# Patient Record
Sex: MALE | Race: BLACK OR AFRICAN AMERICAN | NOT HISPANIC OR LATINO | Employment: FULL TIME | URBAN - METROPOLITAN AREA
[De-identification: names, ages, dates, MRNs, and addresses within clinical notes are randomized per-mention and may not be internally consistent; named-entity substitution may affect disease eponyms.]

---

## 2017-01-23 PROBLEM — E55.9 VITAMIN D DEFICIENCY: Status: ACTIVE | Noted: 2017-01-23

## 2017-01-23 PROBLEM — R17 ELEVATED BILIRUBIN: Status: ACTIVE | Noted: 2017-01-23

## 2017-01-23 PROBLEM — M51.16 LUMBAR DISC HERNIATION WITH RADICULOPATHY: Status: ACTIVE | Noted: 2017-01-23

## 2017-01-23 PROBLEM — R74.8 ELEVATED LIVER ENZYMES: Status: ACTIVE | Noted: 2017-01-23

## 2017-01-24 ENCOUNTER — OFFICE VISIT (OUTPATIENT)
Dept: FAMILY MEDICINE CLINIC | Facility: CLINIC | Age: 57
End: 2017-01-24

## 2017-01-24 VITALS
RESPIRATION RATE: 18 BRPM | OXYGEN SATURATION: 99 % | DIASTOLIC BLOOD PRESSURE: 96 MMHG | HEIGHT: 71 IN | TEMPERATURE: 97.8 F | HEART RATE: 68 BPM | WEIGHT: 221 LBS | BODY MASS INDEX: 30.94 KG/M2 | SYSTOLIC BLOOD PRESSURE: 138 MMHG

## 2017-01-24 DIAGNOSIS — I82.402 ACUTE DEEP VEIN THROMBOSIS (DVT) OF LEFT LOWER EXTREMITY, UNSPECIFIED VEIN (HCC): Primary | ICD-10-CM

## 2017-01-24 DIAGNOSIS — R22.42 MASS OF LOWER LEG, LEFT: ICD-10-CM

## 2017-01-24 PROCEDURE — 99214 OFFICE O/P EST MOD 30 MIN: CPT | Performed by: FAMILY MEDICINE

## 2017-01-24 RX ORDER — RIVAROXABAN 20 MG/1
TABLET, FILM COATED ORAL
COMMUNITY
Start: 2016-11-28 | End: 2017-01-24

## 2017-01-24 NOTE — MR AVS SNAPSHOT
Dylan Sen   1/24/2017 11:00 AM   Office Visit    Dept Phone:  [phone]   Encounter #:  [id number]    Provider:  Isaias Harmon MD   Department:  [facility]                Your Full Care Plan              Today's Medication Changes          These changes are accurate as of: 1/24/17 11:36 AM.  If you have any questions, ask your nurse or doctor.               Medication(s)that have changed:     * XARELTO 20 MG tablet   Generic drug:  rivaroxaban   What changed:  Another medication with the same name was added. Make sure you understand how and when to take each.   Changed by:  Isaias Harmon MD       * rivaroxaban 15 MG tablet   Commonly known as:  XARELTO   Take 1 tablet by mouth Daily.   What changed:  You were already taking a medication with the same name, and this prescription was added. Make sure you understand how and when to take each.   Changed by:  Isaias Harmon MD       * Notice:  This list has 2 medication(s) that are the same as other medications prescribed for you. Read the directions carefully, and ask your doctor or other care provider to review them with you.      Stop taking medication(s)listed here:     HYDROcodone-acetaminophen 5-325 MG per tablet   Commonly known as:  NORCO   Stopped by:  Isaias Harmon MD           oxyCODONE-acetaminophen 5-325 MG per tablet   Commonly known as:  PERCOCET   Stopped by:  Isaias Harmon MD           promethazine 25 MG tablet   Commonly known as:  PHENERGAN   Stopped by:  Isaias Harmon MD                Where to Get Your Medications      Information about where to get these medications is not yet available     ! Ask your nurse or doctor about these medications     rivaroxaban 15 MG tablet                  Your Updated Medication List          This list is accurate as of: 1/24/17 11:36 AM.  Always use your most recent med list.                cholecalciferol 1000 UNITS tablet   Commonly known  "as:  VITAMIN D3       * XARELTO 20 MG tablet   Generic drug:  rivaroxaban       * rivaroxaban 15 MG tablet   Commonly known as:  XARELTO   Take 1 tablet by mouth Daily.       * Notice:  This list has 2 medication(s) that are the same as other medications prescribed for you. Read the directions carefully, and ask your doctor or other care provider to review them with you.            You Were Diagnosed With        Codes Comments    Acute deep vein thrombosis (DVT) of left lower extremity, unspecified vein    -  Primary ICD-10-CM: I82.402  ICD-9-CM: 453.40       Instructions     None    Patient Instructions History      Upcoming Appointments     Visit Type Date Time Department    FOLLOW UP 2017 11:00 AM [facility]      [employer] Signup     [facility] [employer] allows you to send messages to your doctor, view your test results, renew your prescriptions, schedule appointments, and more. To sign up, go to [email or web address] and click on the Sign Up Now link in the New User? box. Enter your [employer] Activation Code exactly as it appears below along with the last four digits of your Social Security Number and your Date of Birth () to complete the sign-up process. If you do not sign up before the expiration date, you must request a new code.    [employer] Activation Code: [email or web address]  Expires: 2017 11:36 AM    If you have questions, you can email [email or web address] or call [phone] to talk to our [employer] staff. Remember, [employer] is NOT to be used for urgent needs. For medical emergencies, dial 911.               Other Info from Your Visit           Allergies     No Known Allergies      Reason for Visit     blood clot left leg and very painful           Vital Signs     Blood Pressure Pulse Temperature Respirations Height Weight    138/96 68 97.8 °F (36.6 °C) (Temporal Artery ) 18 71\" (180.3 cm) 221 lb (100 kg)    Oxygen Saturation Body Mass Index Smoking Status             99% 30.82 " kg/m2 Never Smoker         Problems and Diagnoses Noted     Acute deep vein thrombosis (DVT) of left lower extremity, unspecified vein    -  Primary

## 2017-01-24 NOTE — PROGRESS NOTES
Chief Complaint   Patient presents with   • blood clot left leg and very painful       Subjective     Leg Pain    The incident occurred more than 1 week ago. There was no injury mechanism. The pain is present in the left leg and left knee. The quality of the pain is described as aching and burning. The pain is moderate. The pain has been worsening since onset. Pertinent negatives include no inability to bear weight, loss of sensation, numbness or tingling. He has tried nothing for the symptoms. The treatment provided no relief.       Left Lower Ext DVT  + blood clot in Left leg  Increased in last week.   Pain behind the left knee and increased  no chest pain no shortness of breath  This is different pain then knee issue  + pulling in the left leg    Did not miss any days of the xarelto but increased to 2 per day for last 3 days with increased pain          Past Medical History,Medications, Allergies, and social history was reviewed.    Review of Systems   Constitutional: Negative.    HENT: Negative.    Respiratory: Negative for shortness of breath.    Cardiovascular: Negative for chest pain.   Gastrointestinal: Negative.    Musculoskeletal: Positive for arthralgias (left knee and leg).   Neurological: Negative.  Negative for tingling and numbness.   Psychiatric/Behavioral: Negative.        Objective     Physical Exam   Constitutional: He is oriented to person, place, and time. He appears well-developed and well-nourished.   HENT:   Head: Normocephalic and atraumatic.   Right Ear: External ear normal.   Left Ear: External ear normal.   Eyes: EOM are normal. Pupils are equal, round, and reactive to light.   Cardiovascular: Normal rate and normal heart sounds.    Pulmonary/Chest: Effort normal.   Musculoskeletal:        Left knee: He exhibits decreased range of motion and swelling (tenderness and swelling behind the left knee.  Extending to the calf.  Not red or hot.).   Neurological: He is alert and oriented to  person, place, and time.   Skin: Skin is warm and dry.   Psychiatric: He has a normal mood and affect. His behavior is normal.   Nursing note and vitals reviewed.        Assessment/Plan     Problem List Items Addressed This Visit     None      Visit Diagnoses     Acute deep vein thrombosis (DVT) of left lower extremity, unspecified vein    -  Primary    Relevant Orders    Duplex Venous Lower Extremity - Left CAR    Mass of lower leg, left        Relevant Orders    Ambulatory Referral to Orthopedic Surgery            DISCUSSION  Sent urgently over for a venous Doppler to evaluate for worsening DVT.  Result showed that the clot has resolved.  There was no DVT.  However, it did show a large mass in the popliteal area/And possibly hematoma or atypical Baker cyst.  We will refer to Dr. Clemons for urgent evaluation given increasing pain and swelling.  Since the clot has dissolved and he has been about 2 months since the onset of the DVT, we will stop the Xarelto.  I explained to him that the clot had resolved and there was no further DVT.  However, given structural mass of the leg, and decreased mobility, there is increased of developing another clot.  Therefore, I would like for him to see orthopedics as soon as possible to further assess this before restarting Xarelto.  If it is a hematoma, I do not want to make it worse by continuing the blood thinner in light of the normal scan for clot.  He is agreeable.       MEDICATIONS PRESCRIBED  Requested Prescriptions      No prescriptions requested or ordered in this encounter          Isaias Harmon MD

## 2017-02-20 ENCOUNTER — TELEPHONE (OUTPATIENT)
Dept: FAMILY MEDICINE CLINIC | Facility: CLINIC | Age: 57
End: 2017-02-20

## 2017-02-21 ENCOUNTER — TELEPHONE (OUTPATIENT)
Dept: FAMILY MEDICINE CLINIC | Facility: CLINIC | Age: 57
End: 2017-02-21

## 2017-02-21 NOTE — TELEPHONE ENCOUNTER
----- Message from Elda Chaparro sent at 2/21/2017  1:11 PM EST -----  Contact: TAQUERIA HUNTER  PATIENT RETURNED YOU CALL PLEASE CALL HIM BACK [phone]

## 2017-07-24 ENCOUNTER — OFFICE VISIT (OUTPATIENT)
Dept: FAMILY MEDICINE CLINIC | Facility: CLINIC | Age: 57
End: 2017-07-24

## 2017-07-24 VITALS
DIASTOLIC BLOOD PRESSURE: 90 MMHG | HEIGHT: 71 IN | BODY MASS INDEX: 31.78 KG/M2 | HEART RATE: 64 BPM | SYSTOLIC BLOOD PRESSURE: 124 MMHG | WEIGHT: 227 LBS | RESPIRATION RATE: 20 BRPM | TEMPERATURE: 97.6 F

## 2017-07-24 DIAGNOSIS — E55.9 VITAMIN D DEFICIENCY: ICD-10-CM

## 2017-07-24 DIAGNOSIS — R03.0 ELEVATED BLOOD PRESSURE (NOT HYPERTENSION): Primary | ICD-10-CM

## 2017-07-24 DIAGNOSIS — R53.83 FATIGUE, UNSPECIFIED TYPE: ICD-10-CM

## 2017-07-24 DIAGNOSIS — Z23 NEED FOR TDAP VACCINATION: ICD-10-CM

## 2017-07-24 DIAGNOSIS — Z13.220 SCREENING FOR HYPERLIPIDEMIA: ICD-10-CM

## 2017-07-24 DIAGNOSIS — Z12.5 SCREENING PSA (PROSTATE SPECIFIC ANTIGEN): ICD-10-CM

## 2017-07-24 PROCEDURE — 90471 IMMUNIZATION ADMIN: CPT | Performed by: FAMILY MEDICINE

## 2017-07-24 PROCEDURE — 99396 PREV VISIT EST AGE 40-64: CPT | Performed by: FAMILY MEDICINE

## 2017-07-24 PROCEDURE — 90715 TDAP VACCINE 7 YRS/> IM: CPT | Performed by: FAMILY MEDICINE

## 2017-07-24 NOTE — PROGRESS NOTES
Subjective   Dylan Sen is a 56 y.o. male.     History of Present Illness   Here for annual exam  Colonoscopy is up to date  Tdap booster needs updated today.   He is requesting updated labs. He has been more fatigued recently, requesting testosterone evaluation.  Gradual weight gain since having knee surgery in Nov 2016. Currently not exercising or following specific diet.     The following portions of the patient's history were reviewed and updated as appropriate: allergies, current medications, past family history, past medical history, past social history, past surgical history and problem list.    Review of Systems   Constitutional: Positive for fatigue. Negative for chills and fever.   HENT: Negative for congestion, ear pain and hearing loss.    Eyes: Negative for photophobia, pain and visual disturbance.   Respiratory: Negative for cough, chest tightness and shortness of breath.    Cardiovascular: Negative for chest pain, palpitations and leg swelling.   Gastrointestinal: Negative for abdominal pain, blood in stool, constipation, diarrhea and vomiting.   Endocrine: Negative for cold intolerance, heat intolerance, polydipsia, polyphagia and polyuria.   Genitourinary: Negative for difficulty urinating and dysuria.   Musculoskeletal: Negative for back pain and gait problem.   Skin: Negative for color change, rash and wound.   Allergic/Immunologic: Negative for environmental allergies, food allergies and immunocompromised state.   Neurological: Negative for dizziness, weakness, numbness and headaches.   Hematological: Negative for adenopathy. Does not bruise/bleed easily.   Psychiatric/Behavioral: Negative for agitation, confusion and sleep disturbance.       Objective   Physical Exam   Constitutional: He is oriented to person, place, and time. He appears well-developed and well-nourished.   HENT:   Head: Normocephalic and atraumatic.   Right Ear: Hearing, tympanic membrane, external ear and ear canal  normal.   Left Ear: Hearing, tympanic membrane, external ear and ear canal normal.   Nose: Nose normal.   Mouth/Throat: Uvula is midline, oropharynx is clear and moist and mucous membranes are normal.   Eyes: Conjunctivae and EOM are normal. Pupils are equal, round, and reactive to light.   Neck: Normal range of motion. Neck supple. No tracheal deviation present. No thyromegaly present.   Cardiovascular: Normal rate, regular rhythm, normal heart sounds and intact distal pulses.    No murmur heard.  Pulmonary/Chest: Effort normal and breath sounds normal. No respiratory distress. He has no wheezes.   Abdominal: Soft. Bowel sounds are normal. He exhibits no distension. There is no tenderness.   Musculoskeletal: He exhibits no edema or deformity.   Lymphadenopathy:     He has no cervical adenopathy.   Neurological: He is alert and oriented to person, place, and time. No cranial nerve deficit.   Skin: Skin is warm and dry. No rash noted.   Psychiatric: He has a normal mood and affect. His behavior is normal. Judgment and thought content normal.   Nursing note and vitals reviewed.      Assessment/Plan   Dylan was seen today for annual exam.    Diagnoses and all orders for this visit:    Elevated blood pressure (not hypertension)    Fatigue, unspecified type  -     Comprehensive Metabolic Panel  -     CBC & Differential  -     Testosterone  -     PSA  -     TSH  -     Vitamin B12    Vitamin D deficiency  -     Comprehensive Metabolic Panel  -     CBC & Differential  -     Vitamin D 25 Hydroxy    Need for Tdap vaccination  -     Tdap Vaccine Greater Than or Equal To 6yo IM  -     Comprehensive Metabolic Panel  -     CBC & Differential    Screening for hyperlipidemia  -     Lipid Panel    Screening PSA (prostate specific antigen)  -     PSA      Recheck BP still elevated diastolic. Advised him to monitor BP at home, target is <140/90. If blood pressure is staying elevated, will need to return to start treatment.   Labs  completed today.   Advised him to follow well balanced diet, resume routine exercise. Has gradually gained 10 lbs since knee operation in Nov 2016.   Tdap vaccination updated today, complete every 10 years. Complete influenza vaccination yearly.

## 2017-07-25 LAB
25(OH)D3+25(OH)D2 SERPL-MCNC: 28.1 NG/ML
ALBUMIN SERPL-MCNC: 4.4 G/DL (ref 3.2–4.8)
ALBUMIN/GLOB SERPL: 1.5 G/DL (ref 1.5–2.5)
ALP SERPL-CCNC: 80 U/L (ref 25–100)
ALT SERPL-CCNC: 76 U/L (ref 7–40)
AST SERPL-CCNC: 40 U/L (ref 0–33)
BASOPHILS # BLD AUTO: 0.03 10*3/MM3 (ref 0–0.2)
BASOPHILS NFR BLD AUTO: 0.5 % (ref 0–1)
BILIRUB SERPL-MCNC: 1.6 MG/DL (ref 0.3–1.2)
BUN SERPL-MCNC: 13 MG/DL (ref 9–23)
BUN/CREAT SERPL: 10.8 (ref 7–25)
CALCIUM SERPL-MCNC: 10 MG/DL (ref 8.7–10.4)
CHLORIDE SERPL-SCNC: 105 MMOL/L (ref 99–109)
CHOLEST SERPL-MCNC: 199 MG/DL (ref 0–200)
CO2 SERPL-SCNC: 28 MMOL/L (ref 20–31)
CREAT SERPL-MCNC: 1.2 MG/DL (ref 0.6–1.3)
EOSINOPHIL # BLD AUTO: 0.17 10*3/MM3 (ref 0–0.3)
EOSINOPHIL NFR BLD AUTO: 3.1 % (ref 0–3)
ERYTHROCYTE [DISTWIDTH] IN BLOOD BY AUTOMATED COUNT: 14 % (ref 11.3–14.5)
GLOBULIN SER CALC-MCNC: 2.9 GM/DL
GLUCOSE SERPL-MCNC: 115 MG/DL (ref 70–100)
HCT VFR BLD AUTO: 43.8 % (ref 38.9–50.9)
HDLC SERPL-MCNC: 50 MG/DL (ref 40–60)
HGB BLD-MCNC: 13.5 G/DL (ref 13.1–17.5)
IMM GRANULOCYTES # BLD: 0.02 10*3/MM3 (ref 0–0.03)
IMM GRANULOCYTES NFR BLD: 0.4 % (ref 0–0.6)
LDLC SERPL CALC-MCNC: 128 MG/DL (ref 0–100)
LYMPHOCYTES # BLD AUTO: 2.14 10*3/MM3 (ref 0.6–4.8)
LYMPHOCYTES NFR BLD AUTO: 39.1 % (ref 24–44)
MCH RBC QN AUTO: 25.8 PG (ref 27–31)
MCHC RBC AUTO-ENTMCNC: 30.8 G/DL (ref 32–36)
MCV RBC AUTO: 83.7 FL (ref 80–99)
MONOCYTES # BLD AUTO: 0.57 10*3/MM3 (ref 0–1)
MONOCYTES NFR BLD AUTO: 10.4 % (ref 0–12)
NEUTROPHILS # BLD AUTO: 2.54 10*3/MM3 (ref 1.5–8.3)
NEUTROPHILS NFR BLD AUTO: 46.5 % (ref 41–71)
PLATELET # BLD AUTO: 170 10*3/MM3 (ref 150–450)
POTASSIUM SERPL-SCNC: 4.6 MMOL/L (ref 3.5–5.5)
PROT SERPL-MCNC: 7.3 G/DL (ref 5.7–8.2)
PSA SERPL-MCNC: 1.31 NG/ML (ref 0–4)
RBC # BLD AUTO: 5.23 10*6/MM3 (ref 4.2–5.76)
SODIUM SERPL-SCNC: 140 MMOL/L (ref 132–146)
TESTOST SERPL-MCNC: 333 NG/DL (ref 264–916)
TRIGL SERPL-MCNC: 105 MG/DL (ref 0–150)
TSH SERPL DL<=0.005 MIU/L-ACNC: 1.53 MIU/ML (ref 0.35–5.35)
VIT B12 SERPL-MCNC: 478 PG/ML (ref 211–911)
VLDLC SERPL CALC-MCNC: 21 MG/DL
WBC # BLD AUTO: 5.47 10*3/MM3 (ref 3.5–10.8)

## 2017-09-11 ENCOUNTER — TELEPHONE (OUTPATIENT)
Dept: FAMILY MEDICINE CLINIC | Facility: CLINIC | Age: 57
End: 2017-09-11

## 2017-09-11 ENCOUNTER — OFFICE VISIT (OUTPATIENT)
Dept: FAMILY MEDICINE CLINIC | Facility: CLINIC | Age: 57
End: 2017-09-11

## 2017-09-11 VITALS
SYSTOLIC BLOOD PRESSURE: 122 MMHG | HEART RATE: 64 BPM | DIASTOLIC BLOOD PRESSURE: 92 MMHG | WEIGHT: 219.8 LBS | RESPIRATION RATE: 20 BRPM | BODY MASS INDEX: 30.77 KG/M2 | TEMPERATURE: 97.4 F | HEIGHT: 71 IN

## 2017-09-11 DIAGNOSIS — M51.16 LUMBAR DISC HERNIATION WITH RADICULOPATHY: Primary | ICD-10-CM

## 2017-09-11 DIAGNOSIS — R73.09 ELEVATED GLUCOSE: ICD-10-CM

## 2017-09-11 DIAGNOSIS — I10 ESSENTIAL HYPERTENSION: ICD-10-CM

## 2017-09-11 LAB — HBA1C MFR BLD: 4.6 % (ref 4.8–5.6)

## 2017-09-11 PROCEDURE — 99213 OFFICE O/P EST LOW 20 MIN: CPT | Performed by: FAMILY MEDICINE

## 2017-09-11 NOTE — PROGRESS NOTES
Subjective   Dylan Sen is a 56 y.o. male.     History of Present Illness   He has chronic lumbar back pain, started 2010. Saw Dr. Crooks for this issue, wanted to do surgery but patient declined. He has managed symptoms with chiropractor, but stopped going once symptoms improved. Over the last 4-5 months, symptoms of low back pain has worsened. He recently resumed chiropractor treatment and doing home exercises which does improve pain some.   Pain is located bilateral lumbar spine with radiation of numbness, tingling, pain into both lower extremities.     Elevated blood pressure  This is a chronic issue, hasn't taken medication to treat in years.   History of left knee pain, had recent operation to improve.    He has lost some weight since his last visit in July 2017, 8 pounds.    The following portions of the patient's history were reviewed and updated as appropriate: allergies, current medications, past family history, past medical history, past social history, past surgical history and problem list.    Review of Systems   Constitutional: Negative for chills and fever.   Respiratory: Negative for cough and shortness of breath.    Cardiovascular: Negative for chest pain and palpitations.   Musculoskeletal: Positive for back pain. Negative for gait problem, joint swelling and myalgias.   Neurological: Positive for numbness. Negative for dizziness.       Objective   Physical Exam   Constitutional: He is oriented to person, place, and time. He appears well-developed and well-nourished.   HENT:   Head: Normocephalic and atraumatic.   Right Ear: External ear normal.   Left Ear: External ear normal.   Nose: Nose normal.   Eyes: Conjunctivae are normal.   Neck: Normal range of motion.   Cardiovascular: Normal rate, regular rhythm and normal heart sounds.    Pulmonary/Chest: Effort normal and breath sounds normal.   Musculoskeletal: He exhibits no edema or deformity.        Lumbar back: He exhibits no tenderness,  no bony tenderness and no spasm.   Neurological: He is alert and oriented to person, place, and time. No cranial nerve deficit.   Skin: Skin is warm and dry.   Psychiatric: He has a normal mood and affect. His behavior is normal.   Nursing note and vitals reviewed.      Assessment/Plan   Dylan was seen today for back pain.    Diagnoses and all orders for this visit:    Lumbar disc herniation with radiculopathy  -     Ambulatory Referral to Neurosurgery    Elevated glucose  -     Hemoglobin A1c    Essential hypertension    Referred to Dr. Caraballo, per his request. MRI lumbar spine completed Feb 2016.   Most recent lab results were discussed with patient. He will increase vitamin D replacement to 2000 units/day.   Completed a1c today due to elevated glucose.  He has had elevated diastolic blood pressure on regular occasions. He is aware that this increases his risk of heart attack and stroke, however he is reluctant to start medication treatment. He wants to attempt control with diet and exercise. Since having his left knee operation, he is now able to resume exercise.

## 2017-09-11 NOTE — PATIENT INSTRUCTIONS
Go to the nearest ER or return to clinic if symptoms worsen, fever/chill develop      Back Pain, Adult  Back pain is very common in adults. The cause of back pain is rarely dangerous and the pain often gets better over time. The cause of your back pain may not be known. Some common causes of back pain include:  · Strain of the muscles or ligaments supporting the spine.  · Wear and tear (degeneration) of the spinal disks.  · Arthritis.  · Direct injury to the back.  For many people, back pain may return. Since back pain is rarely dangerous, most people can learn to manage this condition on their own.  HOME CARE INSTRUCTIONS  Watch your back pain for any changes. The following actions may help to lessen any discomfort you are feeling:  · Remain active. It is stressful on your back to sit or  one place for long periods of time. Do not sit, drive, or  one place for more than 30 minutes at a time. Take short walks on even surfaces as soon as you are able. Try to increase the length of time you walk each day.  · Exercise regularly as directed by your health care provider. Exercise helps your back heal faster. It also helps avoid future injury by keeping your muscles strong and flexible.  · Do not stay in bed. Resting more than 1-2 days can delay your recovery.  · Pay attention to your body when you bend and lift. The most comfortable positions are those that put less stress on your recovering back. Always use proper lifting techniques, including:    Bending your knees.    Keeping the load close to your body.    Avoiding twisting.  · Find a comfortable position to sleep. Use a firm mattress and lie on your side with your knees slightly bent. If you lie on your back, put a pillow under your knees.  · Avoid feeling anxious or stressed. Stress increases muscle tension and can worsen back pain. It is important to recognize when you are anxious or stressed and learn ways to manage it, such as with  exercise.  · Take medicines only as directed by your health care provider. Over-the-counter medicines to reduce pain and inflammation are often the most helpful. Your health care provider may prescribe muscle relaxant drugs. These medicines help dull your pain so you can more quickly return to your normal activities and healthy exercise.  · Apply ice to the injured area:    Put ice in a plastic bag.    Place a towel between your skin and the bag.    Leave the ice on for 20 minutes, 2-3 times a day for the first 2-3 days. After that, ice and heat may be alternated to reduce pain and spasms.  · Maintain a healthy weight. Excess weight puts extra stress on your back and makes it difficult to maintain good posture.  SEEK MEDICAL CARE IF:  · You have pain that is not relieved with rest or medicine.  · You have increasing pain going down into the legs or buttocks.  · You have pain that does not improve in one week.  · You have night pain.  · You lose weight.  · You have a fever or chills.  SEEK IMMEDIATE MEDICAL CARE IF:   · You develop new bowel or bladder control problems.  · You have unusual weakness or numbness in your arms or legs.  · You develop nausea or vomiting.  · You develop abdominal pain.  · You feel faint.     This information is not intended to replace advice given to you by your health care provider. Make sure you discuss any questions you have with your health care provider.     Document Released: 12/18/2006 Document Revised: 01/08/2016 Document Reviewed: 04/21/2015  [employer] Patient Education ©2017 [employer]

## 2017-09-12 NOTE — TELEPHONE ENCOUNTER
Please inform patient that Dr. Caraballo requires MRI within the last 6 months before scheduling appointment.  MRI lumbar spine has been ordered. MICHELLE

## 2017-09-25 ENCOUNTER — HOSPITAL ENCOUNTER (OUTPATIENT)
Dept: MRI IMAGING | Facility: HOSPITAL | Age: 57
Discharge: HOME | End: 2017-09-25
Attending: FAMILY MEDICINE | Admitting: FAMILY MEDICINE

## 2017-09-25 PROCEDURE — 72148 MRI LUMBAR SPINE W/O DYE: CPT

## 2017-09-26 DIAGNOSIS — M51.16 LUMBAR DISC HERNIATION WITH RADICULOPATHY: Primary | ICD-10-CM

## 2019-05-13 ENCOUNTER — OFFICE VISIT (OUTPATIENT)
Dept: FAMILY MEDICINE CLINIC | Facility: CLINIC | Age: 59
End: 2019-05-13

## 2019-05-13 VITALS
TEMPERATURE: 96.2 F | RESPIRATION RATE: 18 BRPM | SYSTOLIC BLOOD PRESSURE: 130 MMHG | DIASTOLIC BLOOD PRESSURE: 88 MMHG | HEIGHT: 71 IN | HEART RATE: 60 BPM | WEIGHT: 215 LBS | BODY MASS INDEX: 30.1 KG/M2

## 2019-05-13 DIAGNOSIS — T15.92XA FOREIGN BODY OF LEFT EYE, INITIAL ENCOUNTER: ICD-10-CM

## 2019-05-13 DIAGNOSIS — H57.12 DISCOMFORT OF LEFT EYE: Primary | ICD-10-CM

## 2019-05-13 PROCEDURE — 99213 OFFICE O/P EST LOW 20 MIN: CPT | Performed by: PHYSICIAN ASSISTANT

## 2019-05-13 NOTE — PROGRESS NOTES
Subjective   Dylan Sen is a 58 y.o. male.     History of Present Illness   Pt presents with CC of concern of FB in L eye. Pt was wearing safety goggles and spraying his furnace filter when water went everywhere. Felt something suddenly get into eye. This was on 5/11/19.   Tried flushing out eye with minimal relief  Still feels discomfort in L eye. Some eye itching, tenderness and drainage.   No URI symptoms   Some swelling of eyelids   Does not wear contacts. Does not have established eye doctor.   Some eye pain with movement. No vision changes  Discomfort is felt along medial cornea     The following portions of the patient's history were reviewed and updated as appropriate: allergies, current medications, past family history, past medical history, past social history, past surgical history and problem list.    Review of Systems   Constitutional: Negative.  Negative for chills, diaphoresis, fatigue and fever.   HENT: Negative.  Negative for congestion, ear discharge, ear pain, hearing loss, nosebleeds, postnasal drip, sinus pressure, sneezing and sore throat.    Eyes: Positive for pain, discharge, redness and itching.   Respiratory: Negative.  Negative for cough, chest tightness, shortness of breath and wheezing.    Cardiovascular: Negative.  Negative for chest pain, palpitations and leg swelling.   Gastrointestinal: Negative for abdominal distention, abdominal pain, blood in stool, constipation, diarrhea, nausea and vomiting.   Genitourinary: Negative.  Negative for difficulty urinating, dysuria, flank pain, frequency, hematuria and urgency.   Musculoskeletal: Negative.  Negative for arthralgias, back pain, gait problem, joint swelling, myalgias, neck pain and neck stiffness.   Skin: Negative.  Negative for color change, pallor, rash and wound.   Neurological: Negative for dizziness, syncope, weakness, light-headedness, numbness and headaches.       Objective    Blood pressure 130/88, pulse 60,  "temperature 96.2 °F (35.7 °C), temperature source Temporal, resp. rate 18, height 180.3 cm (71\"), weight 97.5 kg (215 lb).     Physical Exam   Constitutional: He is oriented to person, place, and time. He appears well-developed and well-nourished.   HENT:   Head: Normocephalic and atraumatic.   Left Ear: Tympanic membrane, external ear and ear canal normal.   Nose: Nose normal.   Mouth/Throat: Oropharynx is clear and moist. No oropharyngeal exudate, posterior oropharyngeal edema or posterior oropharyngeal erythema.   Excess cerumen in R ear canal    Eyes: Right conjunctiva is not injected. Right conjunctiva has no hemorrhage. Left conjunctiva is injected. Right eye exhibits normal extraocular motion and no nystagmus. Left eye exhibits normal extraocular motion and no nystagmus.   No visible FB in L eye.   Some irritation around medial L cornea   Dried drainage along skin surrounding eye   Eyelids slightly edematous    Neck: Normal range of motion. Neck supple. No tracheal deviation present. No thyromegaly present.   Cardiovascular: Normal rate, regular rhythm, normal heart sounds and intact distal pulses. Exam reveals no gallop and no friction rub.   No murmur heard.  Pulmonary/Chest: Effort normal and breath sounds normal. No respiratory distress. He has no wheezes. He has no rales. He exhibits no tenderness.   Abdominal: Soft. Bowel sounds are normal. He exhibits no distension and no mass. There is no tenderness. There is no rebound and no guarding. No hernia.   Musculoskeletal: Normal range of motion. He exhibits no edema, tenderness or deformity.   Lymphadenopathy:     He has no cervical adenopathy.   Neurological: He is alert and oriented to person, place, and time.   Skin: Skin is warm and dry.   Psychiatric: He has a normal mood and affect. His behavior is normal. Judgment and thought content normal.   Nursing note and vitals reviewed.      Assessment/Plan   Dylan was seen today for foreign body in " eye.    Diagnoses and all orders for this visit:    Discomfort of left eye  -     Ambulatory Referral to Ophthalmology    Foreign body of left eye, initial encounter  -     Ambulatory Referral to Ophthalmology      Due to nature of possible eye injury and persistent symptoms, will get patient in with ophthalmology. Able to see patient this afternoon. F/U PRN

## 2021-08-18 ENCOUNTER — TELEPHONE (OUTPATIENT)
Dept: FAMILY MEDICINE CLINIC | Facility: CLINIC | Age: 61
End: 2021-08-18

## 2021-08-18 NOTE — TELEPHONE ENCOUNTER
Caller: Kaylah Senciaran GARRETT    Relationship: Self    Best call back number:[phone]    What is the medical concern/diagnosis: PAIN LEFT KNEE    What specialty or service is being requested: OPEN MRI    What is the provider, practice or medical service name:    What is the office location:     What is the office phone number:    Any additional details: PATIENT HAS APPOINTMENT 08/19 AND IS REQUESTING MRI TODAY SO DR MENG WILL HAVE RESULTS FOR APPOINTMENT

## 2021-08-19 ENCOUNTER — OFFICE VISIT (OUTPATIENT)
Dept: FAMILY MEDICINE CLINIC | Facility: CLINIC | Age: 61
End: 2021-08-19

## 2021-08-19 VITALS
HEART RATE: 72 BPM | DIASTOLIC BLOOD PRESSURE: 88 MMHG | SYSTOLIC BLOOD PRESSURE: 130 MMHG | BODY MASS INDEX: 30.78 KG/M2 | TEMPERATURE: 96.8 F | WEIGHT: 215 LBS | HEIGHT: 70 IN | RESPIRATION RATE: 18 BRPM

## 2021-08-19 DIAGNOSIS — S89.92XD INJURY OF LEFT KNEE, SUBSEQUENT ENCOUNTER: Primary | ICD-10-CM

## 2021-08-19 DIAGNOSIS — M25.562 PAIN AND SWELLING OF LEFT KNEE: ICD-10-CM

## 2021-08-19 DIAGNOSIS — R29.898 DIFFICULTY IN WEIGHT BEARING: ICD-10-CM

## 2021-08-19 DIAGNOSIS — R93.6 ABNORMAL MRI, KNEE: ICD-10-CM

## 2021-08-19 DIAGNOSIS — M25.462 PAIN AND SWELLING OF LEFT KNEE: ICD-10-CM

## 2021-08-19 DIAGNOSIS — M25.362 KNEE INSTABILITY, LEFT: ICD-10-CM

## 2021-08-19 PROCEDURE — 99213 OFFICE O/P EST LOW 20 MIN: CPT | Performed by: PHYSICIAN ASSISTANT

## 2021-08-19 NOTE — PROGRESS NOTES
"Subjective   Dylan Sen is a 60 y.o. male.     History of Present Illness   Pt presents with L knee pain   Was racing with grand kids and fell   Fell on Sunday. Fell on concrete. Has some abrasions on palms of hands where he caught himself  Scab on L knee. Pain along lateral knee   Went to ER. XR was normal.   Having some swelling   Pain with extending knee and full extension   Has had meniscus concern in l knee in the past. Had scope but no repair. 4 years ago     Pain is doing a little better.   Hard time bearing weight   Ambulating with crutches.   Knee feels like it wants to buckle     Not taking any medication   Has been icing and elevating     The following portions of the patient's history were reviewed and updated as appropriate: allergies, current medications, past family history, past medical history, past social history, past surgical history and problem list.    Review of Systems   Constitutional: Negative for chills and fever.   Respiratory: Negative for cough, shortness of breath and wheezing.    Cardiovascular: Negative for chest pain.   Gastrointestinal: Negative for diarrhea, nausea and vomiting.   Musculoskeletal: Positive for arthralgias, gait problem and joint swelling.   Skin: Negative for rash.       Objective    Blood pressure 130/88, pulse 72, temperature 96.8 °F (36 °C), resp. rate 18, height 177.8 cm (70\"), weight 97.5 kg (215 lb).     Physical Exam  Vitals and nursing note reviewed.   Constitutional:       Appearance: Normal appearance.   HENT:      Head: Normocephalic.      Right Ear: External ear normal.      Left Ear: External ear normal.      Nose: Nose normal.   Eyes:      Conjunctiva/sclera: Conjunctivae normal.   Cardiovascular:      Rate and Rhythm: Normal rate and regular rhythm.   Pulmonary:      Effort: Pulmonary effort is normal. No respiratory distress.      Breath sounds: Normal breath sounds.   Musculoskeletal:      Left knee: Swelling and bony tenderness present. " Decreased range of motion. Tenderness present over the lateral joint line.      Comments: Ambulating with crutches     superficial scabbed abrasion of   L knee    Skin:     General: Skin is warm and dry.      Comments: Abrasions to palms bilaterally    Neurological:      Mental Status: He is alert and oriented to person, place, and time.   Psychiatric:         Mood and Affect: Mood normal.         Behavior: Behavior normal.         Thought Content: Thought content normal.         Judgment: Judgment normal.         Assessment/Plan   Diagnoses and all orders for this visit:    1. Injury of left knee, subsequent encounter (Primary)  -     MRI Knee Left Without Contrast; Future    2. Pain and swelling of left knee  -     MRI Knee Left Without Contrast; Future    3. Knee instability, left  -     MRI Knee Left Without Contrast; Future    4. Difficulty in weight bearing  -     MRI Knee Left Without Contrast; Future      Proceed with MRI of L knee   Pt will need work documentation. Has not been into work this week.   RICE therapy and NSAID

## 2021-08-20 NOTE — PROGRESS NOTES
I have reviewed the notes, assessments, and/or procedures performed by RAY Mckeon, I concur with her/his documentation of Dylan Sen.

## 2021-08-23 ENCOUNTER — TELEPHONE (OUTPATIENT)
Dept: FAMILY MEDICINE CLINIC | Facility: CLINIC | Age: 61
End: 2021-08-23

## 2021-08-23 NOTE — TELEPHONE ENCOUNTER
Caller: Dylan Sen    Relationship to patient: Self    Best call back number:[phone]    Patient is needing: PATIENT WAS CALLING TO CHECK ON STATUS OF MRI     PLEASE ADVISE

## 2021-09-10 ENCOUNTER — OFFICE VISIT (OUTPATIENT)
Dept: ORTHOPEDIC SURGERY | Facility: CLINIC | Age: 61
End: 2021-09-10

## 2021-09-10 VITALS
HEIGHT: 70 IN | HEART RATE: 63 BPM | BODY MASS INDEX: 30.29 KG/M2 | WEIGHT: 211.6 LBS | DIASTOLIC BLOOD PRESSURE: 102 MMHG | SYSTOLIC BLOOD PRESSURE: 168 MMHG

## 2021-09-10 DIAGNOSIS — S83.422A SPRAIN OF LATERAL COLLATERAL LIGAMENT OF LEFT KNEE, INITIAL ENCOUNTER: Primary | ICD-10-CM

## 2021-09-10 DIAGNOSIS — M17.12 PRIMARY OSTEOARTHRITIS OF LEFT KNEE: ICD-10-CM

## 2021-09-10 DIAGNOSIS — M25.562 LEFT KNEE PAIN, UNSPECIFIED CHRONICITY: ICD-10-CM

## 2021-09-10 PROCEDURE — 99244 OFF/OP CNSLTJ NEW/EST MOD 40: CPT | Performed by: ORTHOPAEDIC SURGERY

## 2021-09-10 RX ORDER — MELOXICAM 15 MG/1
TABLET ORAL
Qty: 90 TABLET | Refills: 1 | Status: SHIPPED | OUTPATIENT
Start: 2021-09-10

## 2021-09-10 NOTE — PROGRESS NOTES
Orthopaedic Clinic Note: Knee New Patient    Chief Complaint   Patient presents with   • Left Knee - Pain        HPI  Consult from: RAY Mckeon    Dylan Sen is a 60 y.o. male who presents with left knee pain for 4 week(s). Onset mechanical fall. Pain is localized to the lateral joint line and is a 3/10 on the pain scale. Pain is described as aching, throbbing and stabbing. Associated symptoms include pain and giving way/buckling. The pain is worse with walking, sleeping, rising from seated position and any movement of the joint; ice make it better. Previous treatments have included: NSAIDS since symptom onset. Although some transient relief was reported with these interventions, these conservative measures have failed and symptoms have persisted. The patient is limited in daily activities and has had a significant decrease in quality of life as a result. He denies fevers, chills, or constitutional symptoms.    I have reviewed the following portions of the patient's history:History of Present Illness    Past Medical History:   Diagnosis Date   • Elevated bilirubin    • Elevated liver enzymes    • Hyperlipidemia    • Lumbar back pain    • Numbness of right foot    • Pharyngitis    • Plantar wart of left foot    • Right shoulder injury    • Right shoulder pain       Past Surgical History:   Procedure Laterality Date   • SHOULDER ARTHROSCOPY Left    • SHOULDER SURGERY      Rotator Cuff      Family History   Problem Relation Age of Onset   • Diabetes Mother    • Heart attack Father    • Diabetes Father    • Hypertension Father      Social History     Socioeconomic History   • Marital status:      Spouse name: Not on file   • Number of children: Not on file   • Years of education: Not on file   • Highest education level: Not on file   Tobacco Use   • Smoking status: Never Smoker   • Smokeless tobacco: Never Used   Substance and Sexual Activity   • Alcohol use: Yes     Comment: social   • Drug  "use: No   • Sexual activity: Defer      Current Outpatient Medications on File Prior to Visit   Medication Sig Dispense Refill   • cholecalciferol (VITAMIN D3) 1000 UNITS tablet Take 1,000 Units by mouth daily.       No current facility-administered medications on file prior to visit.      No Known Allergies     Review of Systems   Constitutional: Negative.    HENT: Negative.    Eyes: Negative.    Respiratory: Negative.    Cardiovascular: Negative.    Gastrointestinal: Negative.    Endocrine: Negative.    Genitourinary: Negative.    Musculoskeletal: Positive for arthralgias.   Skin: Negative.    Allergic/Immunologic: Negative.    Neurological: Negative.    Hematological: Negative.    Psychiatric/Behavioral: Negative.         The patient's Review of Systems was personally reviewed and confirmed as accurate.    The following portions of the patient's history were reviewed and updated as appropriate: allergies, current medications, past family history, past medical history, past social history, past surgical history and problem list.    Physical Exam  Blood pressure (!) 168/102, pulse 63, height 177.8 cm (70\"), weight 96 kg (211 lb 9.6 oz).    Body mass index is 30.36 kg/m².    GENERAL APPEARANCE: awake, alert & oriented x 3, in no acute distress and well developed, well nourished  PSYCH: normal affect  LUNGS:  breathing nonlabored  EYES: sclera anicteric  CARDIOVASCULAR: palpable dorsalis pedis, palpable posterior tibial bilaterally. Capillary refill less than 2 seconds  EXTREMITIES: no clubbing, cyanosis  GAIT:  Normal            Right Lower Extremity Exam:   ----------  Hip Exam  ----------  FLEXION CONTRACTURE: None  FLEXION: 110 degrees  INTERNAL ROTATION: 20 degrees at 90 degrees of flexion   EXTERNAL ROTATION: 40 degrees at 90 degrees of flexion    PAIN WITH HIP MOTION: no  ----------  Knee Exam  ----------  ALIGNMENT: neutral, no varus or valgus deformity     RANGE OF MOTION:  Normal (0-120 degrees) with no " extensor lag or flexion contracture  LIGAMENTOUS STABILITY:   stable to varus and valgus stress at terminal extension and 30 degrees without any evidence of laxity     STRENGTH:  5/5 knee flexion, extension. 5/5 ankle dorsiflexion and plantarflexion.     PAIN WITH PALPATION: denies tenderness to palpation about the knee, denies medial or lateral joint line pain  KNEE EFFUSION: no  PAIN WITH KNEE ROM: no  PATELLAR CREPITUS: yes, asymptomatic  SPECIAL EXAM FINDINGS:  Negative patellar compression    REFLEXES:  PATELLAR 2+/4  ACHILLES 2+/4    CLONUS: negative  STRAIGHT LEG TEST:   negative    SENSATION TO LIGHT TOUCH:  DEEP PERONEAL/SUPERFICIAL PERONEAL/SURAL/SAPHENOUS/TIBIAL:   intact    EDEMA:  no  ERYTHEMA:  no  WOUNDS/INCISIONS: no overlying skin problems.      Left Lower Extremity Exam:   ----------  Hip Exam  ----------  FLEXION CONTRACTURE: None  FLEXION: 110 degrees  INTERNAL ROTATION: 20 degrees at 90 degrees of flexion   EXTERNAL ROTATION: 40 degrees at 90 degrees of flexion    PAIN WITH HIP MOTION: no  ----------  Knee Exam  ----------  ALIGNMENT: mild varus, correctible to neutral    RANGE OF MOTION:  Normal (0-120 degrees) with no extensor lag or flexion contracture  LIGAMENTOUS STABILITY:   stable to varus and valgus stress at terminal extension and 30 degrees; retensioning of the MCL is appreciated with valgus stress at 30 degrees consistent with medial compartment degeneration     STRENGTH:  5/5 knee flexion, extension. 5/5 ankle dorsiflexion and plantarflexion.     PAIN WITH PALPATION: lateral joint line  KNEE EFFUSION: yes, trace effusion  PAIN WITH KNEE ROM: yes, laterally and anteriorly  PATELLAR CREPITUS: yes, painful and symptomatic  SPECIAL EXAM FINDINGS:  none    REFLEXES:  PATELLAR 2+/4  ACHILLES 2+/4    CLONUS: no  STRAIGHT LEG TEST:   negative    SENSATION TO LIGHT TOUCH:  DEEP PERONEAL/SUPERFICIAL PERONEAL/SURAL/SAPHENOUS/TIBIAL:   intact    EDEMA:  no  ERYTHEMA:  no  WOUNDS/INCISIONS:   no      ______________________________________________________________________  ______________________________________________________________________    RADIOGRAPHIC FINDINGS:   Indication: Left knee pain    Comparison: Todays xrays were compared to previous xrays from 9/8/2016    Left knee(s) 4 views: mild tricompartmental osteoarthritis with slight varus alignment.  No acute bony injury or fracture.  Small periarticular osteophytes visualized primarily in the medial compartment.    Outside MRI from 9/1/2021 was personally interpreted.  MRI shows evidence of complex degenerative changes involving the medial compartment of the knee with myxoid changes of the medial meniscus and slight horizontal tear involving the posterior third of the meniscus.  Advanced chondromalacia within the patellofemoral compartment is also identified with small joint effusion.  There does appear to be edema and partial tearing of the lateral collateral ligament with ligament fibers remaining intact.      Assessment/Plan:   Diagnosis Plan   1. Sprain of lateral collateral ligament of left knee, initial encounter  meloxicam (MOBIC) 15 MG tablet   2. Left knee pain, unspecified chronicity  XR Knee 4+ View Left   3. Primary osteoarthritis of left knee       Patient has a partial tear/sprain of the lateral collateral ligament.  I discussed that this can be treated nonoperatively, but will take time to heal.  He is approximately 3 weeks out from injury.  I will take approximately another 3 to 5 weeks for his symptoms to improve.  Offered him a hinged knee brace which he declines.  I recommended in order to expedite healing, and anti-inflammatory could be prescribed.  He is agreeable to this.  Meloxicam will be prescribed.  He will follow-up in 3 weeks for repeat assessment.    Ej Devries MD  09/10/21  10:41 EDT

## 2021-10-01 ENCOUNTER — OFFICE VISIT (OUTPATIENT)
Dept: ORTHOPEDIC SURGERY | Facility: CLINIC | Age: 61
End: 2021-10-01

## 2021-10-01 VITALS
HEIGHT: 70 IN | BODY MASS INDEX: 30.3 KG/M2 | HEART RATE: 66 BPM | DIASTOLIC BLOOD PRESSURE: 100 MMHG | SYSTOLIC BLOOD PRESSURE: 171 MMHG | WEIGHT: 211.64 LBS

## 2021-10-01 DIAGNOSIS — M17.12 PRIMARY OSTEOARTHRITIS OF LEFT KNEE: Primary | ICD-10-CM

## 2021-10-01 PROCEDURE — 20610 DRAIN/INJ JOINT/BURSA W/O US: CPT | Performed by: ORTHOPAEDIC SURGERY

## 2021-10-01 PROCEDURE — 99214 OFFICE O/P EST MOD 30 MIN: CPT | Performed by: ORTHOPAEDIC SURGERY

## 2021-10-01 RX ORDER — BUPIVACAINE HYDROCHLORIDE 2.5 MG/ML
3 INJECTION, SOLUTION EPIDURAL; INFILTRATION; INTRACAUDAL
Status: COMPLETED | OUTPATIENT
Start: 2021-10-01 | End: 2021-10-01

## 2021-10-01 RX ORDER — LIDOCAINE HYDROCHLORIDE 10 MG/ML
3 INJECTION, SOLUTION EPIDURAL; INFILTRATION; INTRACAUDAL; PERINEURAL
Status: COMPLETED | OUTPATIENT
Start: 2021-10-01 | End: 2021-10-01

## 2021-10-01 RX ORDER — TRIAMCINOLONE ACETONIDE 40 MG/ML
80 INJECTION, SUSPENSION INTRA-ARTICULAR; INTRAMUSCULAR
Status: COMPLETED | OUTPATIENT
Start: 2021-10-01 | End: 2021-10-01

## 2021-10-01 RX ADMIN — BUPIVACAINE HYDROCHLORIDE 3 ML: 2.5 INJECTION, SOLUTION EPIDURAL; INFILTRATION; INTRACAUDAL at 09:59

## 2021-10-01 RX ADMIN — LIDOCAINE HYDROCHLORIDE 3 ML: 10 INJECTION, SOLUTION EPIDURAL; INFILTRATION; INTRACAUDAL; PERINEURAL at 09:59

## 2021-10-01 RX ADMIN — TRIAMCINOLONE ACETONIDE 80 MG: 40 INJECTION, SUSPENSION INTRA-ARTICULAR; INTRAMUSCULAR at 09:59

## 2021-10-01 NOTE — PROGRESS NOTES
Procedure   Large Joint Arthrocentesis: L knee  Date/Time: 10/1/2021 9:59 AM  Consent given by: patient  Site marked: site marked  Timeout: Immediately prior to procedure a time out was called to verify the correct patient, procedure, equipment, support staff and site/side marked as required   Supporting Documentation  Indications: pain   Procedure Details  Location: knee - L knee  Preparation: Patient was prepped and draped in the usual sterile fashion  Needle size: 22 G  Approach: anterolateral  Medications administered: 3 mL bupivacaine (PF) 0.25 %; 3 mL lidocaine PF 1% 1 %; 80 mg triamcinolone acetonide 40 MG/ML  Patient tolerance: patient tolerated the procedure well with no immediate complications

## 2021-10-01 NOTE — PROGRESS NOTES
Orthopaedic Clinic Note: Knee Established Patient    Chief Complaint   Patient presents with   • Follow-up     3 week f/u Sprain of lateral collateral ligament of left knee and Primary osteoarthritis of left knee after trial of Meloxicam 15mg which was helpful        HPI    It has been 3  week(s) since Mr. Sen's last visit. He returns to clinic today for follow-up left knee pain.  At his last visit he was prescribed an oral anti-inflammatory.  He returns to clinic today stating that his knee pain is improving.  He currently rates it a 4/10 on the pain scale he is ambulatory with no assistive device.  He is complaining of pain primarily in the medial aspect of the knee as well as in the patellofemoral joint.  He denies any significant lateral based pain.  Overall he is doing slightly better but is still having limitations in daily activities as well as recurrence of knee swelling.    Past Medical History:   Diagnosis Date   • Elevated bilirubin    • Elevated liver enzymes    • Hyperlipidemia    • Lumbar back pain    • Numbness of right foot    • Pharyngitis    • Plantar wart of left foot    • Right shoulder injury    • Right shoulder pain       Past Surgical History:   Procedure Laterality Date   • SHOULDER ARTHROSCOPY Left    • SHOULDER SURGERY      Rotator Cuff      Family History   Problem Relation Age of Onset   • Diabetes Mother    • Heart attack Father    • Diabetes Father    • Hypertension Father      Social History     Socioeconomic History   • Marital status:      Spouse name: Not on file   • Number of children: Not on file   • Years of education: Not on file   • Highest education level: Not on file   Tobacco Use   • Smoking status: Never Smoker   • Smokeless tobacco: Never Used   Substance and Sexual Activity   • Alcohol use: Yes     Comment: social   • Drug use: No   • Sexual activity: Defer      Current Outpatient Medications on File Prior to Visit   Medication Sig Dispense Refill   •  "cholecalciferol (VITAMIN D3) 1000 UNITS tablet Take 1,000 Units by mouth daily.     • meloxicam (MOBIC) 15 MG tablet 1 PO Daily with food. 90 tablet 1     No current facility-administered medications on file prior to visit.      No Known Allergies     Review of Systems   Constitutional: Negative.    HENT: Negative.    Eyes: Negative.    Respiratory: Negative.    Cardiovascular: Negative.    Gastrointestinal: Negative.    Endocrine: Negative.    Genitourinary: Negative.    Musculoskeletal: Positive for arthralgias.   Skin: Negative.    Allergic/Immunologic: Negative.    Neurological: Negative.    Hematological: Negative.    Psychiatric/Behavioral: Negative.         The patient's Review of Systems was personally reviewed and confirmed as accurate.    Physical Exam  Blood pressure 171/100, pulse 66, height 177.8 cm (70\"), weight 96 kg (211 lb 10.3 oz).    Body mass index is 30.37 kg/m².    GENERAL APPEARANCE: awake, alert, oriented, in no acute distress and well developed, well nourished  LUNGS:  breathing nonlabored  EXTREMITIES: no clubbing, cyanosis  PERIPHERAL PULSES: palpable dorsalis pedis and posterior tibial pulses bilaterally.    GAIT:  Antalgic        ----------  Left Knee Exam:  ----------  ALIGNMENT: mild varus, correctible to neutral  ----------  RANGE OF MOTION:  Normal (0-120 degrees) with no extensor lag or flexion contracture  LIGAMENTOUS STABILITY:   stable to varus and valgus stress at terminal extension and 30 degrees; retensioning of the MCL is appreciated with valgus stress at 30 degrees consistent with medial compartment degeneration  ----------  STRENGTH:  KNEE FLEXION 5/5  KNEE EXTENSION  5/5  ANKLE DORSIFLEXION  5/5  ANKLE PLANTARFLEXION  5/5  ----------  PAIN WITH PALPATION:medial joint line and anterior knee  KNEE EFFUSION: yes, trace effusion  PAIN WITH KNEE ROM: yes  PATELLAR CREPITUS:  yes, painful and symptomatic  ----------  SENSATION TO LIGHT TOUCH:  DEEP PERONEAL/SUPERFICIAL " PERONEAL/SURAL/SAPHENOUS/TIBIAL:    intact  ----------  EDEMA:  no  ERYTHEMA:    no  WOUNDS/INCISIONS:   no  _____________________________________________________________________  _____________________________________________________________________    RADIOGRAPHIC FINDINGS:   No new imaging today    Assessment/Plan:   Diagnosis Plan   1. Primary osteoarthritis of left knee       Patient's residual pain is secondary to arthritic inflammation.  His prior MRI did demonstrate significant arthritic changes.  He is having no focal mechanical symptoms.  I recommended proceeding to an intra-articular cortisone injection today.  He is agreeable to this.  He will follow-up as needed.    Procedure Note:  I discussed with the patient the potential benefits of performing a therapeutic injection of the left knee as well as potential risks including but not limited to infection, swelling, pain, bleeding, bruising, nerve/vessel damage, skin color changes, transient elevation in blood glucose levels, and fat atrophy. After informed consent and verifying correct patient, procedure site, and type of procedure, the area was prepped with alcohol, ethyl chloride was used to numb the skin. Via the superior lateral approach, 3cc of 1% lidocaine, 3cc of 0.25% bupivicaine and 2 cc of 40mg/ml of Kenalog were injected into the left knee. The patient tolerated the procedure well. There were no complications. A sterile dressing was placed over the injection site.        Ej Devries MD  10/01/21  10:04 EDT

## 2021-10-19 ENCOUNTER — TELEPHONE (OUTPATIENT)
Dept: FAMILY MEDICINE CLINIC | Facility: CLINIC | Age: 61
End: 2021-10-19

## 2021-10-19 DIAGNOSIS — M25.562 CHRONIC PAIN OF LEFT KNEE: Primary | ICD-10-CM

## 2021-10-19 DIAGNOSIS — G89.29 CHRONIC PAIN OF LEFT KNEE: Primary | ICD-10-CM

## 2021-10-19 NOTE — TELEPHONE ENCOUNTER
I saw him for knee pain in August, is this what he is referring to? Does his orthopedic provider Dr. Devries recommend PT at this time?

## 2021-10-19 NOTE — TELEPHONE ENCOUNTER
Caller: Rosamaria Senghciaran GARRETT    Relationship: Self    Best call back number: [phone]        What specialty or service is being requested: PHYSICAL THERAPY FOR THE LEFT LEG     What is the provider, practice or medical service name: PATIENT DID NOT KNOW THE NAME     What is the office location: THE OFFICE ACROSS THE START FROM THE [facility]  BYPASS  IN [place]       PATIENT STATES THAT HE WAS SEEN FOR HIS LEFT LEG ON 09/09/2021 BY CARIN LOOMIS PLEASE CALL THE PATIENT TO LET HIM KNOW IF HE CAN DO PHYSICAL THAIRAPY

## 2021-10-20 NOTE — TELEPHONE ENCOUNTER
Dr Devries didn't recommend. Pt wants to start PT before starting back exercising so he doesn't hurt/injury himself.

## 2022-02-22 DIAGNOSIS — G89.29 CHRONIC PAIN OF LEFT KNEE: Primary | ICD-10-CM

## 2022-02-22 DIAGNOSIS — M25.562 CHRONIC PAIN OF LEFT KNEE: Primary | ICD-10-CM

## 2023-04-12 ENCOUNTER — OFFICE VISIT (OUTPATIENT)
Dept: FAMILY MEDICINE CLINIC | Facility: CLINIC | Age: 63
End: 2023-04-12
Payer: COMMERCIAL

## 2023-04-12 VITALS
HEART RATE: 67 BPM | OXYGEN SATURATION: 96 % | RESPIRATION RATE: 18 BRPM | DIASTOLIC BLOOD PRESSURE: 92 MMHG | TEMPERATURE: 97.3 F | BODY MASS INDEX: 32.13 KG/M2 | SYSTOLIC BLOOD PRESSURE: 132 MMHG | WEIGHT: 224.4 LBS | HEIGHT: 70 IN

## 2023-04-12 DIAGNOSIS — M25.561 ACUTE PAIN OF RIGHT KNEE: Primary | ICD-10-CM

## 2023-04-12 DIAGNOSIS — E66.9 OBESITY (BMI 30.0-34.9): ICD-10-CM

## 2023-04-12 DIAGNOSIS — Z76.89 ENCOUNTER FOR WEIGHT MANAGEMENT: ICD-10-CM

## 2023-04-12 RX ORDER — TIZANIDINE 4 MG/1
4 TABLET ORAL NIGHTLY PRN
Qty: 30 TABLET | Refills: 2 | Status: SHIPPED | OUTPATIENT
Start: 2023-04-12

## 2023-04-12 RX ORDER — PREDNISONE 10 MG/1
TABLET ORAL
Qty: 21 EACH | Refills: 0 | Status: SHIPPED | OUTPATIENT
Start: 2023-04-12

## 2023-04-12 RX ORDER — SEMAGLUTIDE 0.25 MG/.5ML
0.25 INJECTION, SOLUTION SUBCUTANEOUS
Qty: 2 ML | Refills: 0 | Status: SHIPPED | OUTPATIENT
Start: 2023-04-12

## 2023-04-12 NOTE — PROGRESS NOTES
"Subjective   Dylan GERRY Sen is a 62 y.o. male.     History of Present Illness   Right posterior and medial knee pain that started yesterday while jogging on treadmill   Hard to walk today. Knee feels stiff and hard to bend.   Had to go home from work yesterday   No sensation of popping or fall   Feels like knee is swollen   Has tried icing, topical muscle cream and wearing a compression brace. Also used crutches at home for a bit.   Issues with L knee in 2021 that required steroid injection by ortho     Also has concerns about weight. Body mass index is 32.2 kg/m².   Eating health and exercising regularly but still having difficulty losing weight. Over eats frequently     The following portions of the patient's history were reviewed and updated as appropriate: allergies, current medications, past family history, past medical history, past social history, past surgical history and problem list.    Review of Systems  As noted per HPI     Objective    Blood pressure 132/92, pulse 67, temperature 97.3 °F (36.3 °C), resp. rate 18, height 177.8 cm (70\"), weight 102 kg (224 lb 6.4 oz), SpO2 96 %.     Physical Exam  Vitals reviewed.   Constitutional:       Appearance: Normal appearance.   Cardiovascular:      Rate and Rhythm: Normal rate.   Pulmonary:      Effort: Pulmonary effort is normal.   Musculoskeletal:      Right knee: Swelling present. Decreased range of motion. Tenderness present over the medial joint line.      Comments: Antalgic gait   Mild swelling of posterior R knee    Neurological:      General: No focal deficit present.      Mental Status: He is alert and oriented to person, place, and time.   Psychiatric:         Mood and Affect: Mood normal.         Behavior: Behavior normal.         Assessment & Plan   Diagnoses and all orders for this visit:    1. Acute pain of right knee (Primary)  -     predniSONE (DELTASONE) 10 MG (21) dose pack; Use as directed on package  Dispense: 21 each; Refill: 0  -     " tiZANidine (ZANAFLEX) 4 MG tablet; Take 1 tablet by mouth At Night As Needed for Muscle Spasms.  Dispense: 30 tablet; Refill: 2    2. Obesity (BMI 30.0-34.9)  -     Semaglutide-Weight Management (Wegovy) 0.25 MG/0.5ML solution auto-injector; Inject 0.25 mg under the skin into the appropriate area as directed Every 7 (Seven) Days.  Dispense: 2 mL; Refill: 0    3. Encounter for weight management  -     Semaglutide-Weight Management (Wegovy) 0.25 MG/0.5ML solution auto-injector; Inject 0.25 mg under the skin into the appropriate area as directed Every 7 (Seven) Days.  Dispense: 2 mL; Refill: 0    steroid taper and muscle relaxer for knee. RICE therapy and will remain off work until Monday   Call if not improving to proceed with imaging or ortho consult   Trial of wegovy after completed treatment for knee to help with weight loss. Potential side effects discussed and pt directed on how to use pen

## 2023-04-25 ENCOUNTER — OFFICE VISIT (OUTPATIENT)
Dept: FAMILY MEDICINE CLINIC | Facility: CLINIC | Age: 63
End: 2023-04-25
Payer: COMMERCIAL

## 2023-04-25 ENCOUNTER — HOSPITAL ENCOUNTER (OUTPATIENT)
Dept: GENERAL RADIOLOGY | Facility: HOSPITAL | Age: 63
Discharge: HOME | End: 2023-04-25
Admitting: PHYSICIAN ASSISTANT
Payer: COMMERCIAL

## 2023-04-25 VITALS
DIASTOLIC BLOOD PRESSURE: 92 MMHG | HEART RATE: 60 BPM | SYSTOLIC BLOOD PRESSURE: 154 MMHG | WEIGHT: 222 LBS | OXYGEN SATURATION: 96 % | BODY MASS INDEX: 31.78 KG/M2 | TEMPERATURE: 97.5 F | HEIGHT: 70 IN | RESPIRATION RATE: 18 BRPM

## 2023-04-25 DIAGNOSIS — Z76.89 ENCOUNTER FOR WEIGHT MANAGEMENT: ICD-10-CM

## 2023-04-25 DIAGNOSIS — H00.015 HORDEOLUM EXTERNUM OF LEFT LOWER EYELID: ICD-10-CM

## 2023-04-25 DIAGNOSIS — E66.9 OBESITY (BMI 30.0-34.9): ICD-10-CM

## 2023-04-25 DIAGNOSIS — M25.561 ACUTE PAIN OF RIGHT KNEE: Primary | ICD-10-CM

## 2023-04-25 PROCEDURE — 73560 X-RAY EXAM OF KNEE 1 OR 2: CPT

## 2023-04-25 PROCEDURE — 99213 OFFICE O/P EST LOW 20 MIN: CPT | Performed by: PHYSICIAN ASSISTANT

## 2023-04-25 NOTE — PROGRESS NOTES
"Subjective   Dylan Sen is a 62 y.o. male.     History of Present Illness   F/u for R knee pain   Doing a little better. Able to walk a little easier   Still having pain with certain movements.   Does not feel ready to RTW at this time. Paperwork completed last visit     Stye R lower eyelid X 1 year. Needs referral to eye doctor       The following portions of the patient's history were reviewed and updated as appropriate: allergies, current medications, past family history, past medical history, past social history, past surgical history and problem list.    Review of Systems  As noted per HPI     Objective    Blood pressure 154/92, pulse 60, temperature 97.5 °F (36.4 °C), resp. rate 18, height 177.8 cm (70\"), weight 101 kg (222 lb), SpO2 96 %.     Physical Exam  Vitals reviewed.   Constitutional:       Appearance: Normal appearance.   Eyes:      Comments: White hordeolum of L lower eyelid    Cardiovascular:      Rate and Rhythm: Normal rate.   Pulmonary:      Effort: Pulmonary effort is normal.   Musculoskeletal:      Comments: Antalgic gait    Neurological:      Mental Status: He is alert and oriented to person, place, and time.   Psychiatric:         Mood and Affect: Mood normal.         Behavior: Behavior normal.         Assessment & Plan   Diagnoses and all orders for this visit:    1. Acute pain of right knee (Primary)  -     Ambulatory Referral to Orthopedic Surgery  -     Ambulatory Referral to Physical Therapy Evaluate and treat  -     Cancel: XR knee 4+ vw right  -     XR Knee 1 or 2 View Right    2. Hordeolum externum of left lower eyelid  -     Ambulatory Referral to Ophthalmology     proceed with ortho, PT And XR of right knee for ongoing pain     Referral to ophthalmology for stye            "

## 2023-04-25 NOTE — TELEPHONE ENCOUNTER
Caller: Dylan Sen    Relationship: Self    Best call back number: [phone]    Requested Prescriptions:   Requested Prescriptions     Pending Prescriptions Disp Refills   • Semaglutide-Weight Management (Wegovy) 0.25 MG/0.5ML solution auto-injector 2 mL 0     Sig: Inject 0.25 mg under the skin into the appropriate area as directed Every 7 (Seven) Days.        Pharmacy where request should be sent:   [id number] - [place] [address] [place] [address] AT [facility] [phone] [facility] [phone] FX        Last office visit with prescribing clinician: Visit date not found   Last telemedicine visit with prescribing clinician: Visit date not found   Next office visit with prescribing clinician: Visit date not found     Additional details provided by patient: PHARMACY STATED THAT PCP WILL MIKE TO CONTACT INSURANCE COMPANY FOR P.A  Does the patient have less than a 3 day supply:  [x] Yes  [] No    Would you like a call back once the refill request has been completed: [] Yes [] No    If the office needs to give you a call back, can they leave a voicemail: [x] Yes [] No    Tomer Block Rep   04/25/23 13:54 EDT

## 2023-04-27 RX ORDER — SEMAGLUTIDE 0.25 MG/.5ML
0.25 INJECTION, SOLUTION SUBCUTANEOUS
Qty: 2 ML | Refills: 0 | Status: SHIPPED | OUTPATIENT
Start: 2023-04-27

## 2023-05-03 ENCOUNTER — TELEPHONE (OUTPATIENT)
Dept: FAMILY MEDICINE CLINIC | Facility: CLINIC | Age: 63
End: 2023-05-03
Payer: COMMERCIAL

## 2023-05-08 ENCOUNTER — TREATMENT (OUTPATIENT)
Dept: PHYSICAL THERAPY | Facility: CLINIC | Age: 63
End: 2023-05-08
Payer: COMMERCIAL

## 2023-05-08 DIAGNOSIS — M25.561 ACUTE PAIN OF RIGHT KNEE: Primary | ICD-10-CM

## 2023-05-08 PROCEDURE — 97110 THERAPEUTIC EXERCISES: CPT | Performed by: PHYSICAL THERAPIST

## 2023-05-08 PROCEDURE — 97112 NEUROMUSCULAR REEDUCATION: CPT | Performed by: PHYSICAL THERAPIST

## 2023-05-08 PROCEDURE — 97161 PT EVAL LOW COMPLEX 20 MIN: CPT | Performed by: PHYSICAL THERAPIST

## 2023-05-08 NOTE — PROGRESS NOTES
Physical Therapy Initial Evaluation and Plan of Care             [address]    [place] [address]    Patient: Dylan Davis [facility]   : 1960  Diagnosis/ICD-10 Code:  Acute pain of right knee [M25.561]  Referring practitioner: RAY Anne  Date of Initial Visit: 2023  Today's Date: 2023  Patient seen for 1 session         Visit Diagnoses:    ICD-10-CM ICD-9-CM   1. Acute pain of right knee  M25.561 719.46         Subjective Questionnaire: LEFS: 51      Subjective Evaluation    History of Present Illness  Mechanism of injury: Developed R knee pn after returning to jogging on the TM about a month ago. Hadn't run since prior to COVID. Does not recall having pn while on the treadmill, but was limping when he got off and developed medial knee pn. Pn is intermittent, worsened w/ standing from a chair, walking long distances. Denies popping, clicking, buckling. Has had some swelling but has dissipated w/ ice, elevation. Feels as if he is unable to bend his knee fully. Pn sometimes radiates down medial calf. Had order placed for Orth referral to discuss need for MRI, but has not been called yet to schedule.    Subjective comment: R knee pn  Patient Occupation: [employer] Quality of life: good    Pain  Current pain rating: 3  At best pain ratin  At worst pain ratin  Quality: discomfort  Relieving factors: rest and ice  Progression: no change    Social Support  Lives in: one-story house  Lives with: spouse    Diagnostic Tests  X-ray: normal (mild osteoarthritic changes)    Treatments  Previous treatment: physical therapy  Patient Goals  Patient goals for therapy: decreased pain, increased motion, increased strength and return to sport/leisure activities             Treatment  See Exercise, Manual, and Modality Logs for complete treatment.     Objective          Observations     Right Knee   Negative for deformity and edema.       Tenderness     Right Knee   Tenderness in  the MCL (distal), medial joint line and medial patella.     Active Range of Motion   Left Knee   Flexion: 122 degrees   Extension: 0 degrees     Right Knee   Flexion: 120 degrees with pain  Extension: 0 degrees     Patellar Mobility     Additional Patellar Mobility Details  Mild stiffness w/ bilateral patellar mobility    Strength/Myotome Testing     Right Hip   Planes of Motion   Flexion: 5  Extension: 5  Abduction: 5  External rotation: 5    Right Knee   Flexion: 5  Extension: 5  Quadriceps contraction: good    Tests     Right Knee   Positive medial Emerita (palpable click, slight pn) and Thessaly's test at 20 degrees.   Negative anterior drawer, lateral Emerita, patellar apprehension, posterior drawer, valgus stress test at 0 degrees, valgus stress test at 30 degrees, varus stress test at 0 degrees and varus stress test at 30 degrees.     Right Knee Flexibility Comments:   Hamstring: mild impairment  Quad: moderate impairment  Hip Flexor: moderate impairment  Piriformis: no impairment  IT Band: no impairment  Gastroc: mild impairment  Soleus: mild impairment      Ambulation     Observational Gait   Gait: antalgic   Decreased right stance time.     Functional Assessment     Single Leg Stance   Left: 3 seconds  Right: 3 seconds    Comments  Navigates stairs non reciprocally, leading w/ R leg descending, L leg when ascending            Assessment & Plan     Assessment  Impairments: abnormal gait, abnormal or restricted ROM, activity intolerance, impaired physical strength, lacks appropriate home exercise program and pain with function  Functional Limitations: walking, uncomfortable because of pain, standing and stooping  Assessment details: Pt is a 62 YOM who presents to PT w/ complaint of acute onset R knee pn after returning to treadmill running approx 1 month ago. Findings consistent w/ arthritic knee pn. He demonstrates functional knee mobility, but notes inc medial joint line pn at end range flxn. Hip/knee  strength 5/5. TTP along medial joint line, medial aspect of patella. Demo impaired flexibility of the quad, hamstring, and calf. Noted reproduction of joint line pn w/ Thessdemond and Emerita, w/ (+) clicking-possibly indicating medial meniscus involvement. Pt unable to maintain SLS. Demo mild antalgic gait pattern. Unable to navigate stairs reciprocally 2/2 inc pn. Currently awaiting scheduling for Ortho consult. Pt's pn and deficits limit tolerance to daily and recreational activities. Pt would benefit from skilled PT services to address deficits, decrease pn, and maximize function.    Barriers to therapy: n/a  Prognosis: good    Goals  Plan Goals: STG 4 wks  1) Pt to be compliant w/ initial HEP for ROM, strength and symptom mgmt.  2) Pt to report at least a 30% improvement in symptoms.  3) Pt to ascend stairs reciprocally w/ appropriate mechanics and R knee pn no greater than 2/10.    LTG 8 wks  1) Pt to be independent w/ long term HEP and self mgmt.  2) Pt to report at least a 60% improvement in symptoms.  3) Pt to ascend and descend stairs reciprocally w/ appropriate mechanics and R knee pn no greater than 2/10.  4) Pt to maintain SLS on RLE for 15 sec or greater to reflect improved LE neuromuscular control/stability.  5) Pt to improve LEFS score to 60/80 or better to reflect improved pn and function.      Plan  Therapy options: will be seen for skilled therapy services  Planned modality interventions: cryotherapy, TENS, thermotherapy (hydrocollator packs) and ultrasound  Planned therapy interventions: balance/weight-bearing training, flexibility, functional ROM exercises, gait training, home exercise program, joint mobilization, manual therapy, neuromuscular re-education, soft tissue mobilization, strengthening, stretching, therapeutic activities and transfer training  Frequency: 1x week  Duration in weeks: 12  Treatment plan discussed with: patient  Plan details: TE/TA/NMR/MT to address noted deficits,  modalities as indicated for pn control        History # of Personal Factors and/or Comorbidities: LOW (0)  Examination of Body System(s): # of elements: LOW (1-2)  Clinical Presentation: EVOLVING  Clinical Decision Making: LOW       Timed:         Manual Therapy:    0     mins  34695;     Therapeutic Exercise:    10     mins  04123;     Neuromuscular Aurora:    8    mins  98125;    Therapeutic Activity:     0     mins  59931;     Gait Trainin     mins  15008;     Ultrasound:     0     mins  45983;    Ionto                               0    mins   21040  Self Care                       0     mins   14316  Canalith Repos    0     mins 65775      Un-Timed:  Electrical Stimulation:    0     mins  77054 ( );  Dry Needling     0     mins self-pay  Traction     0     mins 03309  Low Eval     30     Mins  74206  Mod Eval     0     Mins  15873  High Eval                       0     Mins  64304        Timed Treatment:   18   mins   Total Treatment:     48   mins          PT: Fartun Hanley PT     [place] License: #[id number]    Electronically signed by Fartun Hanley, PT, 23, 2:02 PM EDT    Certification Period: 2023 thru 2023  I certify that the therapy services are furnished while this patient is under my care.  The services outlined above are required by this patient, and will be reviewed every 90 days.         Physician Signature:__________________________________________________    PHYSICIAN: Jennifer Small PA      DATE:     Please sign and return via fax to [phone]. Thank you, [facility].

## 2023-05-08 NOTE — PATIENT INSTRUCTIONS
Access Code: [email or web address]  URL: [email or web address]  Date: 05/08/2023  Prepared by: Fartun Hanley    Exercises  - Seated Hamstring Stretch  - 1-2 x daily - 3 reps - 20 sec hold  - Standing Quadriceps Stretch  - 1-2 x daily - 3 reps - 20 sec hold  - Gastroc Stretch on Wall  - 1-2 x daily - 3 reps - 20 sec hold  - Supine Knee Extension Strengthening  - 1-2 x daily - 15-30 reps  - Sidelying Hip Abduction  - 1-2 x daily - 15-30 reps  - Supine Straight Leg Raises  - 1-2 x daily - 15-30 reps    Pt education regarding clinical findings, nature of condition, PT POC, HEP, symptom mgmt.

## 2023-05-15 ENCOUNTER — TREATMENT (OUTPATIENT)
Dept: PHYSICAL THERAPY | Facility: CLINIC | Age: 63
End: 2023-05-15
Payer: COMMERCIAL

## 2023-05-15 DIAGNOSIS — M25.561 ACUTE PAIN OF RIGHT KNEE: Primary | ICD-10-CM

## 2023-05-15 PROCEDURE — 97110 THERAPEUTIC EXERCISES: CPT | Performed by: PHYSICAL THERAPIST

## 2023-05-15 PROCEDURE — 97112 NEUROMUSCULAR REEDUCATION: CPT | Performed by: PHYSICAL THERAPIST

## 2023-05-15 NOTE — PROGRESS NOTES
Physical Therapy Daily Treatment Note  [address]   [place] [address]      Patient: Dylan Floydington   : 1960  Referring practitioner: RAY Anne  Date of Initial Visit: Type: THERAPY  Noted: 2023  Today's Date: 5/15/2023  Patient seen for 2 sessions       Visit Diagnoses:    ICD-10-CM ICD-9-CM   1. Acute pain of right knee  M25.561 719.46       Subjective   Dylan [facility] reports: Quad stretch was painful to do, otherwise HEP going well. Feels that pn w/ walking is slightly improved. Still stiff when he first stands after sitting for a while, but improves after a few steps.    Pre tx pn score: 3-medial knee  Post tx pn score: 1    Treatment  See Exercise, Manual, and Modality Logs for complete treatment.     Objective         Assessment & Plan     Assessment    Assessment details: Pt compliant w/ HEP and tolerating initial exercises well. Noted difficulty/pn w/ standing quad stretch so transitioned to prone w/ use of strap, which he was able to perform w/ appropriate stretch and no pn. Proceeded w/ emphasis on OKC hip/knee strengthening, but was able to progress to light CKC activity w/o issue other than fatigue. Pn dec by end of visit. Discussed adding standing forward reach to HEP.    Plan  Plan details: Cont w/ focus on hip/knee strengthening, progressing WBing activity as pn allows; may attempt low height step up          Timed:         Manual Therapy:    0     mins  09934;     Therapeutic Exercise:    30     mins  96616;     Neuromuscular Aurora:    10    mins  32677;    Therapeutic Activity:     0     mins  96801;     Gait Trainin     mins  04772;     Ultrasound:     0     mins  52127;    Ionto                               0    mins   43976  Self Care                       0     mins   73688  Canalith Repos    0     mins 26892      Un-Timed:  Electrical Stimulation:    0     mins  33389 ( );  Dry Needling     0     mins self-pay  Traction      0     mins 95318      Timed Treatment:   40   mins   Total Treatment:     40   mins    Fartun Hanley, PT  [place] License: #[id number]

## 2023-05-23 ENCOUNTER — PATIENT MESSAGE (OUTPATIENT)
Dept: FAMILY MEDICINE CLINIC | Facility: CLINIC | Age: 63
End: 2023-05-23
Payer: COMMERCIAL

## 2023-05-23 ENCOUNTER — TELEPHONE (OUTPATIENT)
Dept: FAMILY MEDICINE CLINIC | Facility: CLINIC | Age: 63
End: 2023-05-23
Payer: COMMERCIAL

## 2023-05-23 NOTE — TELEPHONE ENCOUNTER
Did patient get cleared by ortho? Do not see consult note in chart and have not seen him in a month for knee issue. Need an update on his symptoms.

## 2023-05-23 NOTE — TELEPHONE ENCOUNTER
Caller: Dylan Sen    Relationship: Self    Best call back number: [phone]    What form or medical record are you requesting:     RETURN TO WORK LETTER    Who is requesting this form or medical record from you:     PATIENT'S EMPLOYER    How would you like to receive the form or medical records (pick-up, mail, fax):  PICKUP    Timeframe paperwork needed: TODAY    Additional notes:     PATIENT NEEDS SIGNED FORM STATING IT IS OK FOR PATIENT TO GO BACK TO WORK. PLEASE CALL WHEN READY

## 2023-05-23 NOTE — TELEPHONE ENCOUNTER
Pt stated ortho had him doing PT 1x wk which he's done with now-pt tried to go back to work yesterday but was told needed letter releasing him and if there are any restrictions-pt stated Jennifer had him off work to 4-24-23

## 2023-05-26 NOTE — TELEPHONE ENCOUNTER
RTW note was written 5/23/23.     Jennifer is out, Dr Harmon can you rf the next dose of Wegovy for him?

## 2023-05-26 NOTE — TELEPHONE ENCOUNTER
From: Dylan Davis [facility]  To: Jennifer Small  Sent: 5/23/2023 12:53 AM EDT  Subject: Work release and another prescription    I need a work release, signed by the doctor stating I’m 100% can go back to work with no restrictions and I need another prescription for the Wegovy thank you I will be in today.

## 2023-05-29 DIAGNOSIS — E66.9 OBESITY (BMI 30-39.9): Primary | ICD-10-CM

## 2023-05-29 RX ORDER — SEMAGLUTIDE 0.5 MG/.5ML
0.5 INJECTION, SOLUTION SUBCUTANEOUS
Qty: 2 ML | Refills: 0 | Status: SHIPPED | OUTPATIENT
Start: 2023-05-29

## 2023-09-07 ENCOUNTER — OFFICE VISIT (OUTPATIENT)
Dept: FAMILY MEDICINE CLINIC | Facility: CLINIC | Age: 63
End: 2023-09-07
Payer: COMMERCIAL

## 2023-09-07 VITALS
WEIGHT: 216 LBS | TEMPERATURE: 97.6 F | HEIGHT: 70 IN | RESPIRATION RATE: 18 BRPM | SYSTOLIC BLOOD PRESSURE: 126 MMHG | DIASTOLIC BLOOD PRESSURE: 80 MMHG | BODY MASS INDEX: 30.92 KG/M2 | HEART RATE: 78 BPM

## 2023-09-07 DIAGNOSIS — Z20.2 POSSIBLE EXPOSURE TO STD: ICD-10-CM

## 2023-09-07 DIAGNOSIS — E87.5 HYPERKALEMIA: Primary | ICD-10-CM

## 2023-09-07 PROCEDURE — 99214 OFFICE O/P EST MOD 30 MIN: CPT | Performed by: FAMILY MEDICINE

## 2023-09-07 NOTE — PROGRESS NOTES
"Chief Complaint  Follow-up    Subjective          Dylan Floydington presents to [facility]  History of Present Illness    The patient is a 62-year-old male who presents today for STD testing.    STD testing  The patient would like to be tested for STDs. He does not think he has been exposed to anything. He denies discharge or burning with urination. He has a cut on the shaft of his penis that is painful. He has been applying Neosporin to the area. He has been sexually active. He noticed the cut approximately the week of 08/21/2023. He got  in 08/2022. He has had a fever blister in the past. He has had a hepatitis B vaccine. He is not on any blood pressure medication. He denies being sick recently, coughing, shortness of breath, cramps, or blood in his bowel movements. He has been sleeping well. He has not eaten today. He denies being nervous or depressed. His father passed away in 10/2022.    He had blood work previously at [facility].      Review of Systems   Constitutional: Negative.    HENT: Negative.     Respiratory: Negative.  Negative for cough and shortness of breath.    Cardiovascular: Negative.  Negative for chest pain and leg swelling.   Gastrointestinal: Negative.  Negative for blood in stool and nausea.   Genitourinary:  Negative for difficulty urinating.   Psychiatric/Behavioral: Negative.  Negative for sleep disturbance and depressed mood. The patient is not nervous/anxious.       Objective       Vital Signs:   /80   Pulse 78   Temp 97.6 °F (36.4 °C)   Resp 18   Ht 177.8 cm (70\")   Wt 98 kg (216 lb)   BMI 30.99 kg/m²     Physical Exam  Vitals and nursing note reviewed.   Constitutional:       General: He is not in acute distress.     Appearance: Normal appearance. He is well-developed. He is not ill-appearing.   HENT:      Head: Normocephalic and atraumatic.      Right Ear: Hearing, tympanic membrane, ear canal and external ear normal. "      Left Ear: Hearing, tympanic membrane, ear canal and external ear normal.      Nose: Nose normal. No congestion or rhinorrhea.      Mouth/Throat:      Mouth: Mucous membranes are moist.      Pharynx: No oropharyngeal exudate or posterior oropharyngeal erythema.   Eyes:      General: Lids are normal.      Conjunctiva/sclera: Conjunctivae normal.      Pupils: Pupils are equal, round, and reactive to light.   Neck:      Thyroid: No thyromegaly.   Cardiovascular:      Rate and Rhythm: Normal rate and regular rhythm.      Heart sounds: Normal heart sounds. No murmur heard.    No friction rub.   Pulmonary:      Effort: Pulmonary effort is normal. No respiratory distress.      Breath sounds: Normal breath sounds. No wheezing or rales.   Abdominal:      General: Bowel sounds are normal. There is no distension.      Palpations: Abdomen is soft. There is no mass.      Tenderness: There is no abdominal tenderness. There is no guarding or rebound.   Musculoskeletal:      Right lower leg: No edema.      Left lower leg: No edema.   Skin:     General: Skin is warm and dry.   Neurological:      General: No focal deficit present.      Mental Status: He is alert.   Psychiatric:         Mood and Affect: Mood normal.         Speech: Speech normal.         Behavior: Behavior normal.      He did not wish to have penile exam at this time    Result Review :                     Assessment and Plan    Diagnoses and all orders for this visit:    1. Hyperkalemia (Primary)  -     Comprehensive Metabolic Panel    2. Possible exposure to STD  -     RPR  -     Hepatitis Panel, Acute  -     HIV-1 / O / 2 Ag / Antibody  -     HSV 1 & 2 - Specific Antibody, IgG  -     Chlamydia trachomatis, Neisseria gonorrhoeae, PCR w/ confirmation - Swab, Urine, Clean Catch  -     Trichomonas vaginalis, PCR - Swab, Urine, Clean Catch    Other orders  -     Interpretation:          DISCUSSION    Patient recently had blood work this year that showed some  hyperkalemia that has not been rechecked.   - We will recheck a CMP today.     Possible exposure to STD.   -We will do blood work and urinalysis as noted.     Further plan as labs is back. Last cholesterol checks in 01/2023 were great. We will not re-check that at this time.     Call sooner with any problems or if the penile cut does not improve with time.        Follow Up   Return for follow up depends on review of labs and testing.    Patient was given instructions and counseling regarding his condition or for health maintenance advice. Please see specific information pulled into the AVS if appropriate.       Isaias Harmon MD        Transcribed from ambient dictation for Isaias Harmon MD by Torin Latham.  09/07/23   15:15 EDT    Patient or patient representative verbalized consent to the visit recording.  I have personally performed the services described in this document as transcribed by the above individual, and it is both accurate and complete.  Isaias Harmon MD  9/10/2023  09:20 EDT

## 2023-09-10 LAB
ALBUMIN SERPL-MCNC: 4.5 G/DL (ref 3.9–4.9)
ALBUMIN/GLOB SERPL: 1.7 {RATIO} (ref 1.2–2.2)
ALP SERPL-CCNC: 72 IU/L (ref 44–121)
ALT SERPL-CCNC: 24 IU/L (ref 0–44)
AST SERPL-CCNC: 23 IU/L (ref 0–40)
BILIRUB SERPL-MCNC: 1.6 MG/DL (ref 0–1.2)
BUN SERPL-MCNC: 11 MG/DL (ref 8–27)
BUN/CREAT SERPL: 9 (ref 10–24)
C TRACH RRNA SPEC QL NAA+PROBE: NEGATIVE
CALCIUM SERPL-MCNC: 10.2 MG/DL (ref 8.6–10.2)
CHLORIDE SERPL-SCNC: 102 MMOL/L (ref 96–106)
CO2 SERPL-SCNC: 25 MMOL/L (ref 20–29)
CREAT SERPL-MCNC: 1.18 MG/DL (ref 0.76–1.27)
EGFRCR SERPLBLD CKD-EPI 2021: 69 ML/MIN/1.73
GLOBULIN SER CALC-MCNC: 2.6 G/DL (ref 1.5–4.5)
GLUCOSE SERPL-MCNC: 121 MG/DL (ref 70–99)
HAV IGM SERPL QL IA: NEGATIVE
HBV CORE IGM SERPL QL IA: NEGATIVE
HBV SURFACE AG SERPL QL IA: NEGATIVE
HCV AB SERPL QL IA: NORMAL
HCV IGG SERPL QL IA: NON REACTIVE
HIV 1+2 AB+HIV1 P24 AG SERPL QL IA: NON REACTIVE
HSV1 IGG SER IA-ACNC: 2.31 INDEX (ref 0–0.9)
HSV2 IGG SER IA-ACNC: 4.67 INDEX (ref 0–0.9)
N GONORRHOEA RRNA SPEC QL NAA+PROBE: NEGATIVE
POTASSIUM SERPL-SCNC: 4.9 MMOL/L (ref 3.5–5.2)
PROT SERPL-MCNC: 7.1 G/DL (ref 6–8.5)
RPR SER QL: NON REACTIVE
SODIUM SERPL-SCNC: 140 MMOL/L (ref 134–144)
T VAGINALIS RRNA SPEC QL NAA+PROBE: NEGATIVE

## 2023-12-20 ENCOUNTER — OFFICE VISIT (OUTPATIENT)
Dept: FAMILY MEDICINE CLINIC | Facility: CLINIC | Age: 63
End: 2023-12-20
Payer: COMMERCIAL

## 2023-12-20 VITALS
TEMPERATURE: 97.7 F | RESPIRATION RATE: 18 BRPM | BODY MASS INDEX: 31.92 KG/M2 | OXYGEN SATURATION: 98 % | HEART RATE: 60 BPM | SYSTOLIC BLOOD PRESSURE: 138 MMHG | WEIGHT: 223 LBS | DIASTOLIC BLOOD PRESSURE: 84 MMHG | HEIGHT: 70 IN

## 2023-12-20 DIAGNOSIS — N52.9 ERECTILE DYSFUNCTION, UNSPECIFIED ERECTILE DYSFUNCTION TYPE: ICD-10-CM

## 2023-12-20 DIAGNOSIS — G89.29 CHRONIC PAIN OF RIGHT KNEE: Primary | ICD-10-CM

## 2023-12-20 DIAGNOSIS — M25.561 CHRONIC PAIN OF RIGHT KNEE: Primary | ICD-10-CM

## 2023-12-20 PROCEDURE — 99214 OFFICE O/P EST MOD 30 MIN: CPT

## 2023-12-20 RX ORDER — TADALAFIL 10 MG/1
10 TABLET ORAL DAILY PRN
Qty: 20 TABLET | Refills: 0 | Status: SHIPPED | OUTPATIENT
Start: 2023-12-20

## 2023-12-20 RX ORDER — LIDOCAINE 50 MG/G
1 PATCH TOPICAL EVERY 24 HOURS
Qty: 30 EACH | Refills: 0 | Status: SHIPPED | OUTPATIENT
Start: 2023-12-20

## 2023-12-20 NOTE — PROGRESS NOTES
"Chief Complaint   Patient presents with    Knee Pain     X 2 weeks this time  Was off work earlier this year because of the knee, had PT and went back to work but now its starting to bother him again,  requesting a MRI       Subjective      Dylan Sen is a 63 y.o. who presents for chronic right knee pain.  States about two years ago he had a fall while running.  States that he has had some knee pain since that time.  He runs on the treadmill and had some discomfort about 6 months ago. Had an xray that should mild arthritis. Completed PT and it improved.   Now, in the last two weeks he has noticed discomfort to the right knee and swelling. Lidocaine patches help significantly   Has ED - has tried cialis before and it helped, would like to try again. No changes to penis or testes.       The following portions of the patient's history were reviewed and updated as appropriate: allergies, current medications, past family history, past medical history, past social history, past surgical history, and problem list.    Review of Systems   Constitutional:  Negative for chills and fever.   Respiratory:  Negative for chest tightness and shortness of breath.    Cardiovascular:  Negative for chest pain.   Genitourinary:  Positive for erectile dysfunction. Negative for penile pain, penile swelling, scrotal swelling and testicular pain.   Neurological:  Negative for dizziness.       Objective   Vital Signs:  /84   Pulse 60   Temp 97.7 °F (36.5 °C)   Resp 18   Ht 177.8 cm (70\")   Wt 101 kg (223 lb)   SpO2 98%   BMI 32.00 kg/m²               Physical Exam  Vitals and nursing note reviewed.   Constitutional:       Appearance: Normal appearance.   Cardiovascular:      Rate and Rhythm: Normal rate and regular rhythm.      Heart sounds: Normal heart sounds.   Pulmonary:      Breath sounds: Normal breath sounds.   Musculoskeletal:      Right knee: Swelling present. Tenderness present over the medial joint line. "      Instability Tests: Anterior drawer test negative. Posterior drawer test negative.   Neurological:      General: No focal deficit present.      Mental Status: He is alert and oriented to person, place, and time.   Psychiatric:         Mood and Affect: Mood normal.         Behavior: Behavior normal.          Result Review                     Assessment and Plan  Diagnoses and all orders for this visit:    1. Chronic pain of right knee (Primary)  -     lidocaine (LIDODERM) 5 %; Place 1 patch on the skin as directed by provider Daily. Remove & Discard patch within 12 hours or as directed by MD  Dispense: 30 each; Refill: 0  -     Ambulatory Referral to Orthopedic Surgery    2. Erectile dysfunction, unspecified erectile dysfunction type  -     tadalafil (Cialis) 10 MG tablet; Take 1 tablet by mouth Daily As Needed for Erectile Dysfunction.  Dispense: 20 tablet; Refill: 0      Lidocaine patches as prescribed. Patient may try OTC 4% patches if unable to get the RX patches covered by insurance.   Referral to ortho for further eval of chronic knee pain.     The patient was educated about tadalafil.  He was counseled on appropriate use, timing and the need for sexual stimulation.  In addition, he understands not to use this medication with nitrates. He was instructed to take the medication about 1 hour prior to sexual activity.  Side effects were explained to the patient. Lastly, he was instructed to go immediately to his nearest emergency room in the event he developed an erection lasting longer than 3 hours. He voiced understanding of all these instructions and the importance of seeking prompt medical attention for an erection lasting longer than 3 hours.           Discussed medications prescribed and OTC medications recommended.  Discussed medication safety, possible side effects and how to take or administer medications. Instructed patient to report any adverse reactions, side effects or concerns.     Reviewed  physical exam findings and plan with patient who verbalized understanding and agrees with plan of care. Patient was given opportunity to ask questions and all concerns were addressed prior to the conclusion of today's visit.     Follow Up  No follow-ups on file.  Patient was given instructions and counseling regarding his condition or for health maintenance advice. Please see specific information pulled into the AVS if appropriate.     Note to patient: The 21st Century Cures Act makes medical notes like these available to patients in the interest of transparency. However, be advised this is a medical document. It is intended as peer to peer communication. It is written in medical language and may contain abbreviations or verbiage that are unfamiliar. It may appear blunt or direct. Medical documents are intended to carry relevant information, facts as evident, and the clinical opinion of the provider.

## 2023-12-21 ENCOUNTER — TELEPHONE (OUTPATIENT)
Dept: FAMILY MEDICINE CLINIC | Facility: CLINIC | Age: 63
End: 2023-12-21
Payer: COMMERCIAL

## 2023-12-21 NOTE — TELEPHONE ENCOUNTER
PA  for tadalafil Key: [id number] was approved faxed for to pharmacy      Pa for lidocaine patches Key: [id number]  Denied form faxed to pharmacy   Called pt

## 2024-02-22 DIAGNOSIS — N52.9 ERECTILE DYSFUNCTION, UNSPECIFIED ERECTILE DYSFUNCTION TYPE: ICD-10-CM

## 2024-02-23 RX ORDER — TADALAFIL 20 MG/1
20 TABLET ORAL DAILY PRN
Qty: 12 TABLET | Refills: 1 | Status: SHIPPED | OUTPATIENT
Start: 2024-02-23

## 2024-03-01 ENCOUNTER — TELEPHONE (OUTPATIENT)
Dept: FAMILY MEDICINE CLINIC | Facility: CLINIC | Age: 64
End: 2024-03-01
Payer: COMMERCIAL

## 2024-03-01 NOTE — TELEPHONE ENCOUNTER
(PA for tadalafil Key: [id number]  This request has been approved using information available on the patient's profile.  Pt informed 3/1

## 2024-03-01 NOTE — TELEPHONE ENCOUNTER
----- Message from Dylan Sen sent at 3/1/2024  1:36 AM EST -----  Regarding: Prescription  Contact: [phone]  Can I  my prescription or is there anything else I need to do?

## 2024-03-25 DIAGNOSIS — N52.9 ERECTILE DYSFUNCTION, UNSPECIFIED ERECTILE DYSFUNCTION TYPE: ICD-10-CM

## 2024-03-25 RX ORDER — TADALAFIL 20 MG/1
20 TABLET ORAL DAILY PRN
Qty: 12 TABLET | Refills: 1 | Status: SHIPPED | OUTPATIENT
Start: 2024-03-25

## 2024-04-22 DIAGNOSIS — N52.9 ERECTILE DYSFUNCTION, UNSPECIFIED ERECTILE DYSFUNCTION TYPE: ICD-10-CM

## 2024-04-22 RX ORDER — TADALAFIL 20 MG/1
20 TABLET ORAL DAILY PRN
Qty: 12 TABLET | Refills: 1 | Status: SHIPPED | OUTPATIENT
Start: 2024-04-22

## 2024-06-10 DIAGNOSIS — N52.9 ERECTILE DYSFUNCTION, UNSPECIFIED ERECTILE DYSFUNCTION TYPE: ICD-10-CM

## 2024-06-10 RX ORDER — TADALAFIL 20 MG/1
20 TABLET ORAL DAILY PRN
Qty: 12 TABLET | Refills: 1 | Status: SHIPPED | OUTPATIENT
Start: 2024-06-10

## 2024-07-10 DIAGNOSIS — N52.9 ERECTILE DYSFUNCTION, UNSPECIFIED ERECTILE DYSFUNCTION TYPE: ICD-10-CM

## 2024-07-10 RX ORDER — TADALAFIL 20 MG/1
20 TABLET ORAL DAILY PRN
Qty: 12 TABLET | Refills: 1 | OUTPATIENT
Start: 2024-07-10

## 2025-02-04 ENCOUNTER — HOSPITAL ENCOUNTER (OUTPATIENT)
Dept: GENERAL RADIOLOGY | Facility: HOSPITAL | Age: 65
Discharge: HOME | End: 2025-02-04
Admitting: NURSE PRACTITIONER
Payer: COMMERCIAL

## 2025-02-04 ENCOUNTER — OFFICE VISIT (OUTPATIENT)
Dept: FAMILY MEDICINE CLINIC | Facility: CLINIC | Age: 65
End: 2025-02-04
Payer: COMMERCIAL

## 2025-02-04 VITALS
HEIGHT: 70 IN | RESPIRATION RATE: 14 BRPM | BODY MASS INDEX: 30.43 KG/M2 | SYSTOLIC BLOOD PRESSURE: 130 MMHG | TEMPERATURE: 97.3 F | DIASTOLIC BLOOD PRESSURE: 82 MMHG | WEIGHT: 212.6 LBS | HEART RATE: 71 BPM | OXYGEN SATURATION: 96 %

## 2025-02-04 DIAGNOSIS — N52.9 ERECTILE DYSFUNCTION, UNSPECIFIED ERECTILE DYSFUNCTION TYPE: ICD-10-CM

## 2025-02-04 DIAGNOSIS — M25.562 LEFT MEDIAL KNEE PAIN: ICD-10-CM

## 2025-02-04 DIAGNOSIS — M25.562 LEFT MEDIAL KNEE PAIN: Primary | ICD-10-CM

## 2025-02-04 PROCEDURE — 73562 X-RAY EXAM OF KNEE 3: CPT

## 2025-02-04 PROCEDURE — 1126F AMNT PAIN NOTED NONE PRSNT: CPT | Performed by: NURSE PRACTITIONER

## 2025-02-04 PROCEDURE — 3075F SYST BP GE 130 - 139MM HG: CPT | Performed by: NURSE PRACTITIONER

## 2025-02-04 PROCEDURE — 99214 OFFICE O/P EST MOD 30 MIN: CPT | Performed by: NURSE PRACTITIONER

## 2025-02-04 PROCEDURE — 3079F DIAST BP 80-89 MM HG: CPT | Performed by: NURSE PRACTITIONER

## 2025-02-04 PROCEDURE — 1159F MED LIST DOCD IN RCRD: CPT | Performed by: NURSE PRACTITIONER

## 2025-02-04 PROCEDURE — 1160F RVW MEDS BY RX/DR IN RCRD: CPT | Performed by: NURSE PRACTITIONER

## 2025-02-04 RX ORDER — TADALAFIL 20 MG/1
20 TABLET ORAL DAILY PRN
Qty: 12 TABLET | Refills: 1 | Status: SHIPPED | OUTPATIENT
Start: 2025-02-04

## 2025-02-04 RX ORDER — NAPROXEN 500 MG/1
500 TABLET ORAL 2 TIMES DAILY WITH MEALS
Qty: 30 TABLET | Refills: 0 | Status: SHIPPED | OUTPATIENT
Start: 2025-02-04

## 2025-02-04 NOTE — PROGRESS NOTES
"  Date: 2025   Patient Name: Dylan Sen  : 1960   MRN: [id number]     Chief Complaint:    Chief Complaint   Patient presents with    Knee Pain     Left side since exercising in December       History of Present Illness: Dylan Sen is a 64 y.o. male who is here today to follow up for Left knee pain that started in mid December, running on the treadmill   He reports he has a hx of mensicus injury in the past with repair years ago  Wears a compression sleeve with minimal relief of symptoms  Not taking any medication   No other associated symptoms.          Review of Systems:   Review of Systems   Musculoskeletal:  Positive for arthralgias.       I have reviewed the patients family history, social history, past medical history, past surgical history and have updated it as appropriate.     Medications:     Current Outpatient Medications:     cholecalciferol (VITAMIN D3) 1000 UNITS tablet, Take 1 tablet by mouth Daily., Disp: , Rfl:     tadalafil (Cialis) 20 MG tablet, Take 1 tablet by mouth Daily As Needed for Erectile Dysfunction., Disp: 12 tablet, Rfl: 1    naproxen (Naprosyn) 500 MG tablet, Take 1 tablet by mouth 2 (Two) Times a Day With Meals., Disp: 30 tablet, Rfl: 0    Allergies:   No Known Allergies    PHQ-9 Total Score:      Physical Exam:  Vital Signs:   Vitals:    25 1442   BP: 130/82   Pulse: 71   Resp: 14   Temp: 97.3 °F (36.3 °C)   SpO2: 96%   Weight: 96.4 kg (212 lb 9.6 oz)   Height: 177.8 cm (70\")     Body mass index is 30.5 kg/m².   BMI is >= 30 and <35. (Class 1 Obesity). The following options were offered after discussion;: weight loss educational material (shared in after visit summary)       Physical Exam  Vitals and nursing note reviewed.   Constitutional:       Appearance: Normal appearance.   HENT:      Head: Normocephalic and atraumatic.   Cardiovascular:      Rate and Rhythm: Normal rate and regular rhythm.   Pulmonary:      Effort: Pulmonary " effort is normal.      Breath sounds: Normal breath sounds.   Musculoskeletal:      Right knee: Normal.      Left knee: Decreased range of motion. Tenderness present over the medial joint line.   Skin:     General: Skin is warm.   Neurological:      General: No focal deficit present.      Mental Status: He is alert and oriented to person, place, and time.   Psychiatric:         Mood and Affect: Mood normal.           Assessment/Plan:   Diagnoses and all orders for this visit:    1. Left medial knee pain (Primary)  -     XR Knee 3 View Left; Future  -     naproxen (Naprosyn) 500 MG tablet; Take 1 tablet by mouth 2 (Two) Times a Day With Meals.  Dispense: 30 tablet; Refill: 0    2. Erectile dysfunction, unspecified erectile dysfunction type  Comments:  uses PRN  Orders:  -     tadalafil (Cialis) 20 MG tablet; Take 1 tablet by mouth Daily As Needed for Erectile Dysfunction.  Dispense: 12 tablet; Refill: 1       Xray ordered, MRI if symptoms do not improve  Naproxen PRN to aid in symptoms  Ice therapy  Wear a brace   Monitor for worsening symptoms.     Follow Up:   Return if symptoms worsen or fail to improve.      Alycia Hawley. MARY ALICE   [facility]

## 2025-08-12 DIAGNOSIS — N52.9 ERECTILE DYSFUNCTION, UNSPECIFIED ERECTILE DYSFUNCTION TYPE: ICD-10-CM

## 2025-08-13 DIAGNOSIS — N52.9 ERECTILE DYSFUNCTION, UNSPECIFIED ERECTILE DYSFUNCTION TYPE: ICD-10-CM

## 2025-08-13 RX ORDER — TADALAFIL 20 MG/1
20 TABLET ORAL DAILY PRN
Qty: 12 TABLET | Refills: 1 | OUTPATIENT
Start: 2025-08-13

## 2025-08-13 RX ORDER — TADALAFIL 20 MG/1
20 TABLET ORAL DAILY PRN
Qty: 12 TABLET | Refills: 1 | Status: SHIPPED | OUTPATIENT
Start: 2025-08-13

## 2025-08-15 ENCOUNTER — TELEPHONE (OUTPATIENT)
Dept: FAMILY MEDICINE CLINIC | Facility: CLINIC | Age: 65
End: 2025-08-15
Payer: COMMERCIAL